# Patient Record
Sex: FEMALE | Race: WHITE | NOT HISPANIC OR LATINO | Employment: OTHER | URBAN - METROPOLITAN AREA
[De-identification: names, ages, dates, MRNs, and addresses within clinical notes are randomized per-mention and may not be internally consistent; named-entity substitution may affect disease eponyms.]

---

## 2018-01-01 ENCOUNTER — HOSPITAL ENCOUNTER (INPATIENT)
Facility: HOSPITAL | Age: 83
LOS: 4 days | Discharge: NURSING, REHAB OR LONG-TERM CARE | End: 2018-01-24
Attending: EMERGENCY MEDICINE | Admitting: HOSPITALIST

## 2018-01-01 ENCOUNTER — OUTSIDE FACILITY SERVICE (OUTPATIENT)
Dept: HOSPITALIST | Facility: HOSPITAL | Age: 83
End: 2018-01-01

## 2018-01-01 ENCOUNTER — APPOINTMENT (OUTPATIENT)
Dept: GENERAL RADIOLOGY | Facility: HOSPITAL | Age: 83
End: 2018-01-01

## 2018-01-01 ENCOUNTER — APPOINTMENT (OUTPATIENT)
Dept: CARDIOLOGY | Facility: HOSPITAL | Age: 83
End: 2018-01-01
Attending: HOSPITALIST

## 2018-01-01 ENCOUNTER — APPOINTMENT (OUTPATIENT)
Dept: CT IMAGING | Facility: HOSPITAL | Age: 83
End: 2018-01-01

## 2018-01-01 ENCOUNTER — HOSPITAL ENCOUNTER (INPATIENT)
Facility: HOSPITAL | Age: 83
LOS: 5 days | End: 2018-02-10
Attending: HOSPITALIST | Admitting: HOSPITALIST

## 2018-01-01 ENCOUNTER — HOSPITAL ENCOUNTER (INPATIENT)
Facility: HOSPITAL | Age: 83
LOS: 9 days | End: 2018-02-05
Attending: EMERGENCY MEDICINE | Admitting: HOSPITALIST

## 2018-01-01 VITALS
SYSTOLIC BLOOD PRESSURE: 102 MMHG | RESPIRATION RATE: 32 BRPM | HEART RATE: 116 BPM | TEMPERATURE: 101.8 F | OXYGEN SATURATION: 79 % | DIASTOLIC BLOOD PRESSURE: 61 MMHG

## 2018-01-01 VITALS
HEIGHT: 62 IN | HEART RATE: 65 BPM | DIASTOLIC BLOOD PRESSURE: 71 MMHG | BODY MASS INDEX: 30.69 KG/M2 | SYSTOLIC BLOOD PRESSURE: 106 MMHG | RESPIRATION RATE: 16 BRPM | OXYGEN SATURATION: 91 % | TEMPERATURE: 97.9 F | WEIGHT: 166.8 LBS

## 2018-01-01 VITALS
HEIGHT: 65 IN | WEIGHT: 155.2 LBS | DIASTOLIC BLOOD PRESSURE: 62 MMHG | OXYGEN SATURATION: 93 % | RESPIRATION RATE: 18 BRPM | BODY MASS INDEX: 25.86 KG/M2 | HEART RATE: 52 BPM | SYSTOLIC BLOOD PRESSURE: 96 MMHG | TEMPERATURE: 96.9 F

## 2018-01-01 DIAGNOSIS — R06.03 RESPIRATORY DISTRESS, ACUTE: Primary | ICD-10-CM

## 2018-01-01 DIAGNOSIS — I50.22 CHRONIC SYSTOLIC CONGESTIVE HEART FAILURE (HCC): ICD-10-CM

## 2018-01-01 DIAGNOSIS — I21.4 NON-STEMI (NON-ST ELEVATED MYOCARDIAL INFARCTION) (HCC): Primary | ICD-10-CM

## 2018-01-01 DIAGNOSIS — M62.81 MUSCLE WEAKNESS (GENERALIZED): ICD-10-CM

## 2018-01-01 DIAGNOSIS — I48.20 CHRONIC A-FIB (HCC): ICD-10-CM

## 2018-01-01 DIAGNOSIS — R09.02 HYPOXIA: ICD-10-CM

## 2018-01-01 LAB
ALBUMIN SERPL-MCNC: 3.4 G/DL (ref 3.5–5.2)
ALBUMIN SERPL-MCNC: 3.9 G/DL (ref 3.5–5.2)
ALBUMIN SERPL-MCNC: 3.9 G/DL (ref 3.5–5.2)
ALBUMIN/GLOB SERPL: 1.1 G/DL
ALBUMIN/GLOB SERPL: 1.2 G/DL
ALBUMIN/GLOB SERPL: 1.3 G/DL
ALP SERPL-CCNC: 50 U/L (ref 39–117)
ALP SERPL-CCNC: 62 U/L (ref 39–117)
ALP SERPL-CCNC: 62 U/L (ref 39–117)
ALT SERPL W P-5'-P-CCNC: 10 U/L (ref 1–33)
ALT SERPL W P-5'-P-CCNC: 9 U/L (ref 1–33)
ALT SERPL W P-5'-P-CCNC: 9 U/L (ref 1–33)
AMORPH URATE CRY URNS QL MICRO: ABNORMAL /HPF
ANION GAP SERPL CALCULATED.3IONS-SCNC: 11.7 MMOL/L
ANION GAP SERPL CALCULATED.3IONS-SCNC: 11.8 MMOL/L
ANION GAP SERPL CALCULATED.3IONS-SCNC: 11.8 MMOL/L
ANION GAP SERPL CALCULATED.3IONS-SCNC: 12.7 MMOL/L
ANION GAP SERPL CALCULATED.3IONS-SCNC: 12.9 MMOL/L
ANION GAP SERPL CALCULATED.3IONS-SCNC: 13.1 MMOL/L
ANION GAP SERPL CALCULATED.3IONS-SCNC: 13.3 MMOL/L
ANION GAP SERPL CALCULATED.3IONS-SCNC: 14.1 MMOL/L
ANION GAP SERPL CALCULATED.3IONS-SCNC: 15.2 MMOL/L
ANION GAP SERPL CALCULATED.3IONS-SCNC: 15.2 MMOL/L
ANION GAP SERPL CALCULATED.3IONS-SCNC: 15.5 MMOL/L
ARTERIAL PATENCY WRIST A: POSITIVE
AST SERPL-CCNC: 20 U/L (ref 1–32)
AST SERPL-CCNC: 20 U/L (ref 1–32)
AST SERPL-CCNC: 25 U/L (ref 1–32)
ATMOSPHERIC PRESS: 755 MMHG
BACTERIA SPEC AEROBE CULT: NORMAL
BACTERIA UR QL AUTO: ABNORMAL /HPF
BASE EXCESS BLDA CALC-SCNC: 1.7 MMOL/L (ref 0–2)
BASOPHILS # BLD AUTO: 0.01 10*3/MM3 (ref 0–0.2)
BASOPHILS NFR BLD AUTO: 0.1 % (ref 0–1.5)
BASOPHILS NFR BLD AUTO: 0.2 % (ref 0–1.5)
BASOPHILS NFR BLD AUTO: 0.3 % (ref 0–1.5)
BDY SITE: ABNORMAL
BH CV ECHO MEAS - AO MAX PG (FULL): 39.2 MMHG
BH CV ECHO MEAS - AO MAX PG: 42.8 MMHG
BH CV ECHO MEAS - AO MEAN PG (FULL): 20 MMHG
BH CV ECHO MEAS - AO MEAN PG: 22 MMHG
BH CV ECHO MEAS - AO ROOT AREA (BSA CORRECTED): 1.9
BH CV ECHO MEAS - AO ROOT AREA: 9.1 CM^2
BH CV ECHO MEAS - AO ROOT DIAM: 3.4 CM
BH CV ECHO MEAS - AO V2 MAX: 327 CM/SEC
BH CV ECHO MEAS - AO V2 MEAN: 220 CM/SEC
BH CV ECHO MEAS - AO V2 VTI: 74.2 CM
BH CV ECHO MEAS - AVA(I,A): 1 CM^2
BH CV ECHO MEAS - AVA(I,D): 1 CM^2
BH CV ECHO MEAS - AVA(V,A): 1 CM^2
BH CV ECHO MEAS - AVA(V,D): 1 CM^2
BH CV ECHO MEAS - BSA(HAYCOCK): 1.9 M^2
BH CV ECHO MEAS - BSA: 1.8 M^2
BH CV ECHO MEAS - BZI_BMI: 32 KILOGRAMS/M^2
BH CV ECHO MEAS - BZI_METRIC_HEIGHT: 157.5 CM
BH CV ECHO MEAS - BZI_METRIC_WEIGHT: 79.4 KG
BH CV ECHO MEAS - CONTRAST EF (2CH): 62.5 ML/M^2
BH CV ECHO MEAS - CONTRAST EF 4CH: 62.5 ML/M^2
BH CV ECHO MEAS - EDV(CUBED): 42.9 ML
BH CV ECHO MEAS - EDV(MOD-SP2): 72 ML
BH CV ECHO MEAS - EDV(MOD-SP4): 80 ML
BH CV ECHO MEAS - EDV(TEICH): 50.9 ML
BH CV ECHO MEAS - EF(CUBED): 71.6 %
BH CV ECHO MEAS - EF(MOD-SP2): 62.5 %
BH CV ECHO MEAS - EF(MOD-SP4): 62.5 %
BH CV ECHO MEAS - EF(TEICH): 64.4 %
BH CV ECHO MEAS - ESV(CUBED): 12.2 ML
BH CV ECHO MEAS - ESV(MOD-SP2): 27 ML
BH CV ECHO MEAS - ESV(MOD-SP4): 30 ML
BH CV ECHO MEAS - ESV(TEICH): 18.1 ML
BH CV ECHO MEAS - FS: 34.3 %
BH CV ECHO MEAS - IVS/LVPW: 1
BH CV ECHO MEAS - IVSD: 0.8 CM
BH CV ECHO MEAS - LAT PEAK E' VEL: 7 CM/SEC
BH CV ECHO MEAS - LV DIASTOLIC VOL/BSA (35-75): 44.3 ML/M^2
BH CV ECHO MEAS - LV MASS(C)D: 75.3 GRAMS
BH CV ECHO MEAS - LV MASS(C)DI: 41.7 GRAMS/M^2
BH CV ECHO MEAS - LV MAX PG: 3.5 MMHG
BH CV ECHO MEAS - LV MEAN PG: 2 MMHG
BH CV ECHO MEAS - LV SYSTOLIC VOL/BSA (12-30): 16.6 ML/M^2
BH CV ECHO MEAS - LV V1 MAX: 94.2 CM/SEC
BH CV ECHO MEAS - LV V1 MEAN: 59 CM/SEC
BH CV ECHO MEAS - LV V1 VTI: 21.6 CM
BH CV ECHO MEAS - LVIDD: 3.5 CM
BH CV ECHO MEAS - LVIDS: 2.3 CM
BH CV ECHO MEAS - LVLD AP2: 6.7 CM
BH CV ECHO MEAS - LVLD AP4: 6.9 CM
BH CV ECHO MEAS - LVLS AP2: 5.9 CM
BH CV ECHO MEAS - LVLS AP4: 6.3 CM
BH CV ECHO MEAS - LVOT AREA (M): 3.5 CM^2
BH CV ECHO MEAS - LVOT AREA: 3.5 CM^2
BH CV ECHO MEAS - LVOT DIAM: 2.1 CM
BH CV ECHO MEAS - LVPWD: 0.8 CM
BH CV ECHO MEAS - MED PEAK E' VEL: 6 CM/SEC
BH CV ECHO MEAS - MV DEC SLOPE: 523.5 CM/SEC^2
BH CV ECHO MEAS - MV DEC TIME: 0.37 SEC
BH CV ECHO MEAS - MV E MAX VEL: 138 CM/SEC
BH CV ECHO MEAS - MV MAX PG: 9.3 MMHG
BH CV ECHO MEAS - MV MEAN PG: 3 MMHG
BH CV ECHO MEAS - MV P1/2T MAX VEL: 153 CM/SEC
BH CV ECHO MEAS - MV P1/2T: 85.6 MSEC
BH CV ECHO MEAS - MV V2 MAX: 152.5 CM/SEC
BH CV ECHO MEAS - MV V2 MEAN: 69 CM/SEC
BH CV ECHO MEAS - MV V2 VTI: 45.9 CM
BH CV ECHO MEAS - MVA P1/2T LCG: 1.4 CM^2
BH CV ECHO MEAS - MVA(P1/2T): 2.6 CM^2
BH CV ECHO MEAS - MVA(VTI): 1.6 CM^2
BH CV ECHO MEAS - PA ACC TIME: 0.15 SEC
BH CV ECHO MEAS - PA MAX PG (FULL): 6.3 MMHG
BH CV ECHO MEAS - PA MAX PG: 7.4 MMHG
BH CV ECHO MEAS - PA PR(ACCEL): 10.2 MMHG
BH CV ECHO MEAS - PA V2 MAX: 136 CM/SEC
BH CV ECHO MEAS - PVA(V,A): 1.4 CM^2
BH CV ECHO MEAS - PVA(V,D): 1.4 CM^2
BH CV ECHO MEAS - QP/QS: 0.55
BH CV ECHO MEAS - RAP SYSTOLE: 8 MMHG
BH CV ECHO MEAS - RV MAX PG: 1.1 MMHG
BH CV ECHO MEAS - RV MEAN PG: 0 MMHG
BH CV ECHO MEAS - RV V1 MAX: 51.7 CM/SEC
BH CV ECHO MEAS - RV V1 MEAN: 27.9 CM/SEC
BH CV ECHO MEAS - RV V1 VTI: 10.8 CM
BH CV ECHO MEAS - RVOT AREA: 3.8 CM^2
BH CV ECHO MEAS - RVOT DIAM: 2.2 CM
BH CV ECHO MEAS - RVSP: 38.5 MMHG
BH CV ECHO MEAS - SI(AO): 373 ML/M^2
BH CV ECHO MEAS - SI(CUBED): 17 ML/M^2
BH CV ECHO MEAS - SI(LVOT): 41.4 ML/M^2
BH CV ECHO MEAS - SI(MOD-SP2): 24.9 ML/M^2
BH CV ECHO MEAS - SI(MOD-SP4): 27.7 ML/M^2
BH CV ECHO MEAS - SI(TEICH): 18.1 ML/M^2
BH CV ECHO MEAS - SV(AO): 673.7 ML
BH CV ECHO MEAS - SV(CUBED): 30.7 ML
BH CV ECHO MEAS - SV(LVOT): 74.8 ML
BH CV ECHO MEAS - SV(MOD-SP2): 45 ML
BH CV ECHO MEAS - SV(MOD-SP4): 50 ML
BH CV ECHO MEAS - SV(RVOT): 41.1 ML
BH CV ECHO MEAS - SV(TEICH): 32.7 ML
BH CV ECHO MEAS - TAPSE (>1.6): 1.6 CM2
BH CV ECHO MEAS - TR MAX VEL: 276 CM/SEC
BH CV VAS BP LEFT ARM: NORMAL MMHG
BH CV XLRA - RV BASE: 4.5 CM
BH CV XLRA - TDI S': 10 CM/SEC
BILIRUB SERPL-MCNC: 0.7 MG/DL (ref 0.1–1.2)
BILIRUB SERPL-MCNC: 0.8 MG/DL (ref 0.1–1.2)
BILIRUB SERPL-MCNC: 1 MG/DL (ref 0.1–1.2)
BILIRUB UR QL STRIP: NEGATIVE
BUN BLD-MCNC: 10 MG/DL (ref 8–23)
BUN BLD-MCNC: 14 MG/DL (ref 8–23)
BUN BLD-MCNC: 17 MG/DL (ref 8–23)
BUN BLD-MCNC: 19 MG/DL (ref 8–23)
BUN BLD-MCNC: 20 MG/DL (ref 8–23)
BUN BLD-MCNC: 21 MG/DL (ref 8–23)
BUN BLD-MCNC: 23 MG/DL (ref 8–23)
BUN BLD-MCNC: 24 MG/DL (ref 8–23)
BUN BLD-MCNC: 30 MG/DL (ref 8–23)
BUN BLD-MCNC: 33 MG/DL (ref 8–23)
BUN BLD-MCNC: 9 MG/DL (ref 8–23)
BUN/CREAT SERPL: 11.1 (ref 7–25)
BUN/CREAT SERPL: 11.5 (ref 7–25)
BUN/CREAT SERPL: 18.7 (ref 7–25)
BUN/CREAT SERPL: 26 (ref 7–25)
BUN/CREAT SERPL: 26.2 (ref 7–25)
BUN/CREAT SERPL: 31.1 (ref 7–25)
BUN/CREAT SERPL: 32.8 (ref 7–25)
BUN/CREAT SERPL: 33.3 (ref 7–25)
BUN/CREAT SERPL: 37.7 (ref 7–25)
BUN/CREAT SERPL: 46.5 (ref 7–25)
BUN/CREAT SERPL: 51.7 (ref 7–25)
CALCIUM SPEC-SCNC: 9 MG/DL (ref 8.6–10.5)
CALCIUM SPEC-SCNC: 9 MG/DL (ref 8.6–10.5)
CALCIUM SPEC-SCNC: 9.1 MG/DL (ref 8.6–10.5)
CALCIUM SPEC-SCNC: 9.2 MG/DL (ref 8.6–10.5)
CALCIUM SPEC-SCNC: 9.4 MG/DL (ref 8.6–10.5)
CALCIUM SPEC-SCNC: 9.6 MG/DL (ref 8.6–10.5)
CALCIUM SPEC-SCNC: 9.6 MG/DL (ref 8.6–10.5)
CALCIUM SPEC-SCNC: 9.8 MG/DL (ref 8.6–10.5)
CALCIUM SPEC-SCNC: 9.9 MG/DL (ref 8.6–10.5)
CHLORIDE SERPL-SCNC: 101 MMOL/L (ref 98–107)
CHLORIDE SERPL-SCNC: 103 MMOL/L (ref 98–107)
CHLORIDE SERPL-SCNC: 103 MMOL/L (ref 98–107)
CHLORIDE SERPL-SCNC: 104 MMOL/L (ref 98–107)
CHLORIDE SERPL-SCNC: 104 MMOL/L (ref 98–107)
CHLORIDE SERPL-SCNC: 105 MMOL/L (ref 98–107)
CHLORIDE SERPL-SCNC: 105 MMOL/L (ref 98–107)
CHLORIDE SERPL-SCNC: 107 MMOL/L (ref 98–107)
CHLORIDE SERPL-SCNC: 111 MMOL/L (ref 98–107)
CHLORIDE SERPL-SCNC: 98 MMOL/L (ref 98–107)
CHLORIDE SERPL-SCNC: 99 MMOL/L (ref 98–107)
CHOLEST SERPL-MCNC: 135 MG/DL (ref 0–200)
CHOLEST SERPL-MCNC: 184 MG/DL (ref 0–200)
CK MB SERPL-CCNC: 6.17 NG/ML
CK SERPL-CCNC: 91 U/L (ref 20–180)
CLARITY UR: ABNORMAL
CO2 SERPL-SCNC: 24.8 MMOL/L (ref 22–29)
CO2 SERPL-SCNC: 25.2 MMOL/L (ref 22–29)
CO2 SERPL-SCNC: 25.9 MMOL/L (ref 22–29)
CO2 SERPL-SCNC: 26.1 MMOL/L (ref 22–29)
CO2 SERPL-SCNC: 26.3 MMOL/L (ref 22–29)
CO2 SERPL-SCNC: 26.5 MMOL/L (ref 22–29)
CO2 SERPL-SCNC: 26.7 MMOL/L (ref 22–29)
CO2 SERPL-SCNC: 26.8 MMOL/L (ref 22–29)
CO2 SERPL-SCNC: 26.9 MMOL/L (ref 22–29)
CO2 SERPL-SCNC: 27.2 MMOL/L (ref 22–29)
CO2 SERPL-SCNC: 27.3 MMOL/L (ref 22–29)
COLOR UR: YELLOW
CREAT BLD-MCNC: 0.58 MG/DL (ref 0.57–1)
CREAT BLD-MCNC: 0.61 MG/DL (ref 0.57–1)
CREAT BLD-MCNC: 0.61 MG/DL (ref 0.57–1)
CREAT BLD-MCNC: 0.64 MG/DL (ref 0.57–1)
CREAT BLD-MCNC: 0.65 MG/DL (ref 0.57–1)
CREAT BLD-MCNC: 0.71 MG/DL (ref 0.57–1)
CREAT BLD-MCNC: 0.72 MG/DL (ref 0.57–1)
CREAT BLD-MCNC: 0.75 MG/DL (ref 0.57–1)
CREAT BLD-MCNC: 0.77 MG/DL (ref 0.57–1)
CREAT BLD-MCNC: 0.78 MG/DL (ref 0.57–1)
CREAT BLD-MCNC: 0.9 MG/DL (ref 0.57–1)
D-LACTATE SERPL-SCNC: 0.9 MMOL/L (ref 0.5–2)
D-LACTATE SERPL-SCNC: 1.3 MMOL/L (ref 0.5–2)
DEPRECATED RDW RBC AUTO: 43.1 FL (ref 37–54)
DEPRECATED RDW RBC AUTO: 43.7 FL (ref 37–54)
DEPRECATED RDW RBC AUTO: 43.9 FL (ref 37–54)
DEPRECATED RDW RBC AUTO: 44.2 FL (ref 37–54)
DEPRECATED RDW RBC AUTO: 44.5 FL (ref 37–54)
DEPRECATED RDW RBC AUTO: 45.4 FL (ref 37–54)
E/E' RATIO: 22
EOSINOPHIL # BLD AUTO: 0.04 10*3/MM3 (ref 0–0.7)
EOSINOPHIL # BLD AUTO: 0.04 10*3/MM3 (ref 0–0.7)
EOSINOPHIL # BLD AUTO: 0.06 10*3/MM3 (ref 0–0.7)
EOSINOPHIL # BLD AUTO: 0.08 10*3/MM3 (ref 0–0.7)
EOSINOPHIL # BLD AUTO: 0.09 10*3/MM3 (ref 0–0.7)
EOSINOPHIL # BLD AUTO: 0.11 10*3/MM3 (ref 0–0.7)
EOSINOPHIL NFR BLD AUTO: 0.9 % (ref 0.3–6.2)
EOSINOPHIL NFR BLD AUTO: 1 % (ref 0.3–6.2)
EOSINOPHIL NFR BLD AUTO: 1.3 % (ref 0.3–6.2)
EOSINOPHIL NFR BLD AUTO: 1.4 % (ref 0.3–6.2)
EOSINOPHIL NFR BLD AUTO: 1.4 % (ref 0.3–6.2)
EOSINOPHIL NFR BLD AUTO: 2.2 % (ref 0.3–6.2)
ERYTHROCYTE [DISTWIDTH] IN BLOOD BY AUTOMATED COUNT: 12.7 % (ref 11.7–13)
ERYTHROCYTE [DISTWIDTH] IN BLOOD BY AUTOMATED COUNT: 12.8 % (ref 11.7–13)
ERYTHROCYTE [DISTWIDTH] IN BLOOD BY AUTOMATED COUNT: 12.9 % (ref 11.7–13)
ERYTHROCYTE [DISTWIDTH] IN BLOOD BY AUTOMATED COUNT: 13 % (ref 11.7–13)
ERYTHROCYTE [DISTWIDTH] IN BLOOD BY AUTOMATED COUNT: 13 % (ref 11.7–13)
ERYTHROCYTE [DISTWIDTH] IN BLOOD BY AUTOMATED COUNT: 13.1 % (ref 11.7–13)
FLUAV AG NPH QL: NEGATIVE
FLUBV AG NPH QL IA: NEGATIVE
GAS FLOW AIRWAY: 6 LPM
GFR SERPL CREATININE-BSD FRML MDRD: 59 ML/MIN/1.73
GFR SERPL CREATININE-BSD FRML MDRD: 70 ML/MIN/1.73
GFR SERPL CREATININE-BSD FRML MDRD: 71 ML/MIN/1.73
GFR SERPL CREATININE-BSD FRML MDRD: 73 ML/MIN/1.73
GFR SERPL CREATININE-BSD FRML MDRD: 76 ML/MIN/1.73
GFR SERPL CREATININE-BSD FRML MDRD: 78 ML/MIN/1.73
GFR SERPL CREATININE-BSD FRML MDRD: 86 ML/MIN/1.73
GFR SERPL CREATININE-BSD FRML MDRD: 88 ML/MIN/1.73
GFR SERPL CREATININE-BSD FRML MDRD: 93 ML/MIN/1.73
GFR SERPL CREATININE-BSD FRML MDRD: 93 ML/MIN/1.73
GFR SERPL CREATININE-BSD FRML MDRD: 98 ML/MIN/1.73
GLOBULIN UR ELPH-MCNC: 2.7 GM/DL
GLOBULIN UR ELPH-MCNC: 3.3 GM/DL
GLOBULIN UR ELPH-MCNC: 3.6 GM/DL
GLUCOSE BLD-MCNC: 100 MG/DL (ref 65–99)
GLUCOSE BLD-MCNC: 101 MG/DL (ref 65–99)
GLUCOSE BLD-MCNC: 103 MG/DL (ref 65–99)
GLUCOSE BLD-MCNC: 107 MG/DL (ref 65–99)
GLUCOSE BLD-MCNC: 108 MG/DL (ref 65–99)
GLUCOSE BLD-MCNC: 108 MG/DL (ref 65–99)
GLUCOSE BLD-MCNC: 141 MG/DL (ref 65–99)
GLUCOSE BLD-MCNC: 90 MG/DL (ref 65–99)
GLUCOSE BLD-MCNC: 94 MG/DL (ref 65–99)
GLUCOSE BLD-MCNC: 96 MG/DL (ref 65–99)
GLUCOSE BLD-MCNC: 97 MG/DL (ref 65–99)
GLUCOSE BLDC GLUCOMTR-MCNC: 117 MG/DL (ref 70–130)
GLUCOSE UR STRIP-MCNC: NEGATIVE MG/DL
HBA1C MFR BLD: 4.6 % (ref 4.8–5.6)
HBA1C MFR BLD: 4.78 % (ref 4.8–5.6)
HCO3 BLDA-SCNC: 26 MMOL/L (ref 22–28)
HCT VFR BLD AUTO: 37.9 % (ref 35.6–45.5)
HCT VFR BLD AUTO: 38 % (ref 35.6–45.5)
HCT VFR BLD AUTO: 38 % (ref 35.6–45.5)
HCT VFR BLD AUTO: 38.7 % (ref 35.6–45.5)
HCT VFR BLD AUTO: 38.8 % (ref 35.6–45.5)
HCT VFR BLD AUTO: 42.3 % (ref 35.6–45.5)
HDLC SERPL-MCNC: 41 MG/DL (ref 40–60)
HDLC SERPL-MCNC: 44 MG/DL (ref 40–60)
HGB BLD-MCNC: 12.5 G/DL (ref 11.9–15.5)
HGB BLD-MCNC: 12.5 G/DL (ref 11.9–15.5)
HGB BLD-MCNC: 12.8 G/DL (ref 11.9–15.5)
HGB BLD-MCNC: 12.9 G/DL (ref 11.9–15.5)
HGB BLD-MCNC: 13.2 G/DL (ref 11.9–15.5)
HGB BLD-MCNC: 14.4 G/DL (ref 11.9–15.5)
HGB UR QL STRIP.AUTO: ABNORMAL
HYALINE CASTS UR QL AUTO: ABNORMAL /LPF
IMM GRANULOCYTES # BLD: 0 10*3/MM3 (ref 0–0.03)
IMM GRANULOCYTES # BLD: 0.02 10*3/MM3 (ref 0–0.03)
IMM GRANULOCYTES # BLD: 0.03 10*3/MM3 (ref 0–0.03)
IMM GRANULOCYTES # BLD: 0.05 10*3/MM3 (ref 0–0.03)
IMM GRANULOCYTES NFR BLD: 0 % (ref 0–0.5)
IMM GRANULOCYTES NFR BLD: 0.5 % (ref 0–0.5)
IMM GRANULOCYTES NFR BLD: 0.5 % (ref 0–0.5)
IMM GRANULOCYTES NFR BLD: 0.6 % (ref 0–0.5)
INR PPP: 1.39 (ref 0.9–1.1)
INR PPP: 1.55 (ref 0.9–1.1)
INR PPP: 1.58 (ref 0.9–1.1)
INR PPP: 1.59 (ref 0.9–1.1)
INR PPP: 1.65 (ref 0.9–1.1)
INR PPP: 1.83 (ref 0.9–1.1)
INR PPP: 2.17 (ref 0.9–1.1)
INR PPP: 2.54 (ref 0.9–1.1)
INR PPP: 2.55 (ref 0.9–1.1)
INR PPP: 2.58 (ref 0.9–1.1)
INR PPP: 2.6 (ref 0.9–1.1)
INR PPP: 2.66 (ref 0.9–1.1)
INR PPP: 3.03 (ref 0.9–1.1)
KETONES UR QL STRIP: NEGATIVE
LDLC SERPL CALC-MCNC: 120 MG/DL (ref 0–100)
LDLC SERPL CALC-MCNC: 72 MG/DL (ref 0–100)
LDLC/HDLC SERPL: 1.77 {RATIO}
LDLC/HDLC SERPL: 2.74 {RATIO}
LEFT ATRIUM VOLUME INDEX: 87 ML/M2
LEFT ATRIUM VOLUME: 147 CM3
LEUKOCYTE ESTERASE UR QL STRIP.AUTO: ABNORMAL
LYMPHOCYTES # BLD AUTO: 0.54 10*3/MM3 (ref 0.9–4.8)
LYMPHOCYTES # BLD AUTO: 0.7 10*3/MM3 (ref 0.9–4.8)
LYMPHOCYTES # BLD AUTO: 0.76 10*3/MM3 (ref 0.9–4.8)
LYMPHOCYTES # BLD AUTO: 0.83 10*3/MM3 (ref 0.9–4.8)
LYMPHOCYTES # BLD AUTO: 0.88 10*3/MM3 (ref 0.9–4.8)
LYMPHOCYTES # BLD AUTO: 0.9 10*3/MM3 (ref 0.9–4.8)
LYMPHOCYTES NFR BLD AUTO: 11.1 % (ref 19.6–45.3)
LYMPHOCYTES NFR BLD AUTO: 11.3 % (ref 19.6–45.3)
LYMPHOCYTES NFR BLD AUTO: 15.8 % (ref 19.6–45.3)
LYMPHOCYTES NFR BLD AUTO: 17.4 % (ref 19.6–45.3)
LYMPHOCYTES NFR BLD AUTO: 17.8 % (ref 19.6–45.3)
LYMPHOCYTES NFR BLD AUTO: 20.2 % (ref 19.6–45.3)
MAXIMAL PREDICTED HEART RATE: 132 BPM
MCH RBC QN AUTO: 30.9 PG (ref 26.9–32)
MCH RBC QN AUTO: 30.9 PG (ref 26.9–32)
MCH RBC QN AUTO: 31.4 PG (ref 26.9–32)
MCH RBC QN AUTO: 31.4 PG (ref 26.9–32)
MCH RBC QN AUTO: 31.9 PG (ref 26.9–32)
MCH RBC QN AUTO: 32 PG (ref 26.9–32)
MCHC RBC AUTO-ENTMCNC: 32.2 G/DL (ref 32.4–36.3)
MCHC RBC AUTO-ENTMCNC: 33 G/DL (ref 32.4–36.3)
MCHC RBC AUTO-ENTMCNC: 33.3 G/DL (ref 32.4–36.3)
MCHC RBC AUTO-ENTMCNC: 33.7 G/DL (ref 32.4–36.3)
MCHC RBC AUTO-ENTMCNC: 34 G/DL (ref 32.4–36.3)
MCHC RBC AUTO-ENTMCNC: 34.7 G/DL (ref 32.4–36.3)
MCV RBC AUTO: 92.2 FL (ref 80.5–98.2)
MCV RBC AUTO: 92.8 FL (ref 80.5–98.2)
MCV RBC AUTO: 93.1 FL (ref 80.5–98.2)
MCV RBC AUTO: 93.6 FL (ref 80.5–98.2)
MCV RBC AUTO: 95.2 FL (ref 80.5–98.2)
MCV RBC AUTO: 95.8 FL (ref 80.5–98.2)
MODALITY: ABNORMAL
MONOCYTES # BLD AUTO: 0.27 10*3/MM3 (ref 0.2–1.2)
MONOCYTES # BLD AUTO: 0.36 10*3/MM3 (ref 0.2–1.2)
MONOCYTES # BLD AUTO: 0.4 10*3/MM3 (ref 0.2–1.2)
MONOCYTES # BLD AUTO: 0.43 10*3/MM3 (ref 0.2–1.2)
MONOCYTES # BLD AUTO: 0.47 10*3/MM3 (ref 0.2–1.2)
MONOCYTES # BLD AUTO: 0.72 10*3/MM3 (ref 0.2–1.2)
MONOCYTES NFR BLD AUTO: 10.2 % (ref 5–12)
MONOCYTES NFR BLD AUTO: 6.6 % (ref 5–12)
MONOCYTES NFR BLD AUTO: 8.3 % (ref 5–12)
MONOCYTES NFR BLD AUTO: 8.4 % (ref 5–12)
MONOCYTES NFR BLD AUTO: 8.8 % (ref 5–12)
MONOCYTES NFR BLD AUTO: 9 % (ref 5–12)
NEUTROPHILS # BLD AUTO: 2.78 10*3/MM3 (ref 1.9–8.1)
NEUTROPHILS # BLD AUTO: 2.9 10*3/MM3 (ref 1.9–8.1)
NEUTROPHILS # BLD AUTO: 3.17 10*3/MM3 (ref 1.9–8.1)
NEUTROPHILS # BLD AUTO: 3.72 10*3/MM3 (ref 1.9–8.1)
NEUTROPHILS # BLD AUTO: 4.11 10*3/MM3 (ref 1.9–8.1)
NEUTROPHILS # BLD AUTO: 6.35 10*3/MM3 (ref 1.9–8.1)
NEUTROPHILS NFR BLD AUTO: 70.7 % (ref 42.7–76)
NEUTROPHILS NFR BLD AUTO: 70.8 % (ref 42.7–76)
NEUTROPHILS NFR BLD AUTO: 72.7 % (ref 42.7–76)
NEUTROPHILS NFR BLD AUTO: 73.7 % (ref 42.7–76)
NEUTROPHILS NFR BLD AUTO: 78 % (ref 42.7–76)
NEUTROPHILS NFR BLD AUTO: 78.2 % (ref 42.7–76)
NITRITE UR QL STRIP: NEGATIVE
NRBC BLD MANUAL-RTO: 0 /100 WBC (ref 0–0)
NT-PROBNP SERPL-MCNC: 1253 PG/ML (ref 0–1800)
NT-PROBNP SERPL-MCNC: 1392 PG/ML (ref 0–1800)
NT-PROBNP SERPL-MCNC: 1613 PG/ML (ref 0–1800)
NT-PROBNP SERPL-MCNC: 1919 PG/ML (ref 0–1800)
NT-PROBNP SERPL-MCNC: 1922 PG/ML (ref 0–1800)
NT-PROBNP SERPL-MCNC: 1987 PG/ML (ref 0–1800)
NT-PROBNP SERPL-MCNC: 2173 PG/ML (ref 0–1800)
NT-PROBNP SERPL-MCNC: 2557 PG/ML (ref 0–1800)
PCO2 BLDA: 38.9 MM HG (ref 35–45)
PH BLDA: 7.43 PH UNITS (ref 7.35–7.45)
PH UR STRIP.AUTO: 7.5 [PH] (ref 5–8)
PLATELET # BLD AUTO: 109 10*3/MM3 (ref 140–500)
PLATELET # BLD AUTO: 117 10*3/MM3 (ref 140–500)
PLATELET # BLD AUTO: 127 10*3/MM3 (ref 140–500)
PLATELET # BLD AUTO: 129 10*3/MM3 (ref 140–500)
PLATELET # BLD AUTO: 130 10*3/MM3 (ref 140–500)
PLATELET # BLD AUTO: 143 10*3/MM3 (ref 140–500)
PMV BLD AUTO: 10.1 FL (ref 6–12)
PMV BLD AUTO: 10.1 FL (ref 6–12)
PMV BLD AUTO: 10.2 FL (ref 6–12)
PMV BLD AUTO: 10.8 FL (ref 6–12)
PMV BLD AUTO: 11.1 FL (ref 6–12)
PMV BLD AUTO: 9.7 FL (ref 6–12)
PO2 BLDA: 63 MM HG (ref 80–100)
POTASSIUM BLD-SCNC: 3 MMOL/L (ref 3.5–5.2)
POTASSIUM BLD-SCNC: 3 MMOL/L (ref 3.5–5.2)
POTASSIUM BLD-SCNC: 3.5 MMOL/L (ref 3.5–5.2)
POTASSIUM BLD-SCNC: 3.5 MMOL/L (ref 3.5–5.2)
POTASSIUM BLD-SCNC: 3.8 MMOL/L (ref 3.5–5.2)
POTASSIUM BLD-SCNC: 3.9 MMOL/L (ref 3.5–5.2)
POTASSIUM BLD-SCNC: 4.1 MMOL/L (ref 3.5–5.2)
POTASSIUM BLD-SCNC: 4.1 MMOL/L (ref 3.5–5.2)
POTASSIUM BLD-SCNC: 4.2 MMOL/L (ref 3.5–5.2)
POTASSIUM BLD-SCNC: 4.2 MMOL/L (ref 3.5–5.2)
POTASSIUM BLD-SCNC: 4.4 MMOL/L (ref 3.5–5.2)
PROCALCITONIN SERPL-MCNC: 0.04 NG/ML (ref 0.1–0.25)
PROT SERPL-MCNC: 6.1 G/DL (ref 6–8.5)
PROT SERPL-MCNC: 7.2 G/DL (ref 6–8.5)
PROT SERPL-MCNC: 7.5 G/DL (ref 6–8.5)
PROT UR QL STRIP: NEGATIVE
PROTHROMBIN TIME: 16.5 SECONDS (ref 11.7–14.2)
PROTHROMBIN TIME: 18.1 SECONDS (ref 11.7–14.2)
PROTHROMBIN TIME: 18.3 SECONDS (ref 11.7–14.2)
PROTHROMBIN TIME: 18.4 SECONDS (ref 11.7–14.2)
PROTHROMBIN TIME: 19 SECONDS (ref 11.7–14.2)
PROTHROMBIN TIME: 20.6 SECONDS (ref 11.7–14.2)
PROTHROMBIN TIME: 23.4 SECONDS (ref 11.7–14.2)
PROTHROMBIN TIME: 26.5 SECONDS (ref 11.7–14.2)
PROTHROMBIN TIME: 26.6 SECONDS (ref 11.7–14.2)
PROTHROMBIN TIME: 26.9 SECONDS (ref 11.7–14.2)
PROTHROMBIN TIME: 27 SECONDS (ref 11.7–14.2)
PROTHROMBIN TIME: 27.5 SECONDS (ref 11.7–14.2)
PROTHROMBIN TIME: 30.5 SECONDS (ref 11.7–14.2)
RBC # BLD AUTO: 3.98 10*6/MM3 (ref 3.9–5.2)
RBC # BLD AUTO: 4.05 10*6/MM3 (ref 3.9–5.2)
RBC # BLD AUTO: 4.08 10*6/MM3 (ref 3.9–5.2)
RBC # BLD AUTO: 4.12 10*6/MM3 (ref 3.9–5.2)
RBC # BLD AUTO: 4.17 10*6/MM3 (ref 3.9–5.2)
RBC # BLD AUTO: 4.52 10*6/MM3 (ref 3.9–5.2)
RBC # UR: ABNORMAL /HPF
REF LAB TEST METHOD: ABNORMAL
SAO2 % BLDCOA: 92.4 % (ref 92–99)
SODIUM BLD-SCNC: 139 MMOL/L (ref 136–145)
SODIUM BLD-SCNC: 140 MMOL/L (ref 136–145)
SODIUM BLD-SCNC: 141 MMOL/L (ref 136–145)
SODIUM BLD-SCNC: 141 MMOL/L (ref 136–145)
SODIUM BLD-SCNC: 143 MMOL/L (ref 136–145)
SODIUM BLD-SCNC: 143 MMOL/L (ref 136–145)
SODIUM BLD-SCNC: 145 MMOL/L (ref 136–145)
SODIUM BLD-SCNC: 145 MMOL/L (ref 136–145)
SODIUM BLD-SCNC: 146 MMOL/L (ref 136–145)
SODIUM BLD-SCNC: 146 MMOL/L (ref 136–145)
SODIUM BLD-SCNC: 148 MMOL/L (ref 136–145)
SP GR UR STRIP: 1.01 (ref 1–1.03)
SQUAMOUS #/AREA URNS HPF: ABNORMAL /HPF
STRESS TARGET HR: 112 BPM
TOTAL RATE: 18 BREATHS/MINUTE
TRANS CELLS #/AREA URNS HPF: ABNORMAL /HPF
TRIGL SERPL-MCNC: 108 MG/DL (ref 0–150)
TRIGL SERPL-MCNC: 98 MG/DL (ref 0–150)
TROPONIN T SERPL-MCNC: 0.02 NG/ML (ref 0–0.03)
TROPONIN T SERPL-MCNC: 0.14 NG/ML (ref 0–0.03)
TROPONIN T SERPL-MCNC: 0.34 NG/ML (ref 0–0.03)
TROPONIN T SERPL-MCNC: <0.01 NG/ML (ref 0–0.03)
TSH SERPL DL<=0.05 MIU/L-ACNC: 1.69 MIU/ML (ref 0.27–4.2)
TSH SERPL DL<=0.05 MIU/L-ACNC: 2.87 MIU/ML (ref 0.27–4.2)
UROBILINOGEN UR QL STRIP: ABNORMAL
VALPROATE FREE SERPL-MCNC: 2.3 UG/ML (ref 6–22)
VALPROATE SERPL-MCNC: 33 MCG/ML (ref 50–125)
VLDLC SERPL-MCNC: 19.6 MG/DL (ref 5–40)
VLDLC SERPL-MCNC: 21.6 MG/DL (ref 5–40)
WBC NRBC COR # BLD: 3.93 10*3/MM3 (ref 4.5–10.7)
WBC NRBC COR # BLD: 4.1 10*3/MM3 (ref 4.5–10.7)
WBC NRBC COR # BLD: 4.36 10*3/MM3 (ref 4.5–10.7)
WBC NRBC COR # BLD: 4.76 10*3/MM3 (ref 4.5–10.7)
WBC NRBC COR # BLD: 5.58 10*3/MM3 (ref 4.5–10.7)
WBC NRBC COR # BLD: 8.14 10*3/MM3 (ref 4.5–10.7)
WBC UR QL AUTO: ABNORMAL /HPF

## 2018-01-01 PROCEDURE — 83605 ASSAY OF LACTIC ACID: CPT | Performed by: EMERGENCY MEDICINE

## 2018-01-01 PROCEDURE — 84484 ASSAY OF TROPONIN QUANT: CPT | Performed by: HOSPITALIST

## 2018-01-01 PROCEDURE — 85025 COMPLETE CBC W/AUTO DIFF WBC: CPT | Performed by: HOSPITALIST

## 2018-01-01 PROCEDURE — 25010000002 MORPHINE PER 10 MG: Performed by: HOSPITALIST

## 2018-01-01 PROCEDURE — 93227 XTRNL ECG REC<48 HR R&I: CPT | Performed by: INTERNAL MEDICINE

## 2018-01-01 PROCEDURE — 85610 PROTHROMBIN TIME: CPT | Performed by: HOSPITALIST

## 2018-01-01 PROCEDURE — 80048 BASIC METABOLIC PNL TOTAL CA: CPT | Performed by: INTERNAL MEDICINE

## 2018-01-01 PROCEDURE — 94799 UNLISTED PULMONARY SVC/PX: CPT

## 2018-01-01 PROCEDURE — 87804 INFLUENZA ASSAY W/OPTIC: CPT | Performed by: EMERGENCY MEDICINE

## 2018-01-01 PROCEDURE — 83880 ASSAY OF NATRIURETIC PEPTIDE: CPT | Performed by: HOSPITALIST

## 2018-01-01 PROCEDURE — 83880 ASSAY OF NATRIURETIC PEPTIDE: CPT | Performed by: EMERGENCY MEDICINE

## 2018-01-01 PROCEDURE — 82553 CREATINE MB FRACTION: CPT | Performed by: INTERNAL MEDICINE

## 2018-01-01 PROCEDURE — G8997 SWALLOW GOAL STATUS: HCPCS

## 2018-01-01 PROCEDURE — 25010000002 FUROSEMIDE PER 20 MG: Performed by: HOSPITALIST

## 2018-01-01 PROCEDURE — 25010000002 LORAZEPAM PER 2 MG: Performed by: HOSPITALIST

## 2018-01-01 PROCEDURE — 36415 COLL VENOUS BLD VENIPUNCTURE: CPT

## 2018-01-01 PROCEDURE — 85610 PROTHROMBIN TIME: CPT | Performed by: INTERNAL MEDICINE

## 2018-01-01 PROCEDURE — 80165 DIPROPYLACETIC ACID FREE: CPT | Performed by: INTERNAL MEDICINE

## 2018-01-01 PROCEDURE — 93005 ELECTROCARDIOGRAM TRACING: CPT | Performed by: EMERGENCY MEDICINE

## 2018-01-01 PROCEDURE — 85610 PROTHROMBIN TIME: CPT | Performed by: EMERGENCY MEDICINE

## 2018-01-01 PROCEDURE — 93010 ELECTROCARDIOGRAM REPORT: CPT | Performed by: INTERNAL MEDICINE

## 2018-01-01 PROCEDURE — 97162 PT EVAL MOD COMPLEX 30 MIN: CPT

## 2018-01-01 PROCEDURE — 82550 ASSAY OF CK (CPK): CPT | Performed by: INTERNAL MEDICINE

## 2018-01-01 PROCEDURE — 84145 PROCALCITONIN (PCT): CPT | Performed by: EMERGENCY MEDICINE

## 2018-01-01 PROCEDURE — 99304 1ST NF CARE SF/LOW MDM 25: CPT | Performed by: INTERNAL MEDICINE

## 2018-01-01 PROCEDURE — 25010000002 CEFTRIAXONE PER 250 MG: Performed by: HOSPITALIST

## 2018-01-01 PROCEDURE — 99222 1ST HOSP IP/OBS MODERATE 55: CPT | Performed by: INTERNAL MEDICINE

## 2018-01-01 PROCEDURE — 71045 X-RAY EXAM CHEST 1 VIEW: CPT

## 2018-01-01 PROCEDURE — 80048 BASIC METABOLIC PNL TOTAL CA: CPT | Performed by: HOSPITALIST

## 2018-01-01 PROCEDURE — 84443 ASSAY THYROID STIM HORMONE: CPT | Performed by: HOSPITALIST

## 2018-01-01 PROCEDURE — 82962 GLUCOSE BLOOD TEST: CPT

## 2018-01-01 PROCEDURE — 99285 EMERGENCY DEPT VISIT HI MDM: CPT

## 2018-01-01 PROCEDURE — 97110 THERAPEUTIC EXERCISES: CPT

## 2018-01-01 PROCEDURE — 25010000002 FUROSEMIDE PER 20 MG: Performed by: EMERGENCY MEDICINE

## 2018-01-01 PROCEDURE — 80061 LIPID PANEL: CPT | Performed by: HOSPITALIST

## 2018-01-01 PROCEDURE — 80164 ASSAY DIPROPYLACETIC ACD TOT: CPT | Performed by: EMERGENCY MEDICINE

## 2018-01-01 PROCEDURE — 84484 ASSAY OF TROPONIN QUANT: CPT | Performed by: EMERGENCY MEDICINE

## 2018-01-01 PROCEDURE — 99232 SBSQ HOSP IP/OBS MODERATE 35: CPT | Performed by: INTERNAL MEDICINE

## 2018-01-01 PROCEDURE — 87040 BLOOD CULTURE FOR BACTERIA: CPT | Performed by: EMERGENCY MEDICINE

## 2018-01-01 PROCEDURE — 83036 HEMOGLOBIN GLYCOSYLATED A1C: CPT | Performed by: HOSPITALIST

## 2018-01-01 PROCEDURE — G8996 SWALLOW CURRENT STATUS: HCPCS

## 2018-01-01 PROCEDURE — 70450 CT HEAD/BRAIN W/O DYE: CPT

## 2018-01-01 PROCEDURE — 92610 EVALUATE SWALLOWING FUNCTION: CPT

## 2018-01-01 PROCEDURE — 94640 AIRWAY INHALATION TREATMENT: CPT

## 2018-01-01 PROCEDURE — 80053 COMPREHEN METABOLIC PANEL: CPT | Performed by: EMERGENCY MEDICINE

## 2018-01-01 PROCEDURE — 93306 TTE W/DOPPLER COMPLETE: CPT | Performed by: INTERNAL MEDICINE

## 2018-01-01 PROCEDURE — 92526 ORAL FUNCTION THERAPY: CPT

## 2018-01-01 PROCEDURE — 93005 ELECTROCARDIOGRAM TRACING: CPT | Performed by: HOSPITALIST

## 2018-01-01 PROCEDURE — 25010000002 METHYLPREDNISOLONE PER 125 MG: Performed by: EMERGENCY MEDICINE

## 2018-01-01 PROCEDURE — 85025 COMPLETE CBC W/AUTO DIFF WBC: CPT | Performed by: EMERGENCY MEDICINE

## 2018-01-01 PROCEDURE — 25010000002 ENOXAPARIN PER 10 MG: Performed by: INTERNAL MEDICINE

## 2018-01-01 PROCEDURE — 80053 COMPREHEN METABOLIC PANEL: CPT | Performed by: HOSPITALIST

## 2018-01-01 PROCEDURE — 93225 XTRNL ECG REC<48 HRS REC: CPT

## 2018-01-01 PROCEDURE — G8998 SWALLOW D/C STATUS: HCPCS

## 2018-01-01 PROCEDURE — 83880 ASSAY OF NATRIURETIC PEPTIDE: CPT | Performed by: INTERNAL MEDICINE

## 2018-01-01 PROCEDURE — 93226 XTRNL ECG REC<48 HR SCAN A/R: CPT

## 2018-01-01 PROCEDURE — 81001 URINALYSIS AUTO W/SCOPE: CPT | Performed by: EMERGENCY MEDICINE

## 2018-01-01 PROCEDURE — 71046 X-RAY EXAM CHEST 2 VIEWS: CPT

## 2018-01-01 PROCEDURE — 93306 TTE W/DOPPLER COMPLETE: CPT

## 2018-01-01 PROCEDURE — 82803 BLOOD GASES ANY COMBINATION: CPT

## 2018-01-01 PROCEDURE — 36600 WITHDRAWAL OF ARTERIAL BLOOD: CPT

## 2018-01-01 PROCEDURE — 84484 ASSAY OF TROPONIN QUANT: CPT | Performed by: INTERNAL MEDICINE

## 2018-01-01 RX ORDER — PANTOPRAZOLE SODIUM 40 MG/1
40 TABLET, DELAYED RELEASE ORAL DAILY
Status: DISCONTINUED | OUTPATIENT
Start: 2018-01-01 | End: 2018-01-01

## 2018-01-01 RX ORDER — POTASSIUM CHLORIDE 750 MG/1
20 CAPSULE, EXTENDED RELEASE ORAL DAILY
Status: DISCONTINUED | OUTPATIENT
Start: 2018-01-01 | End: 2018-01-01

## 2018-01-01 RX ORDER — LORAZEPAM 1 MG/1
1 TABLET ORAL
Status: DISCONTINUED | OUTPATIENT
Start: 2018-01-01 | End: 2018-01-01 | Stop reason: HOSPADM

## 2018-01-01 RX ORDER — FUROSEMIDE 10 MG/ML
40 INJECTION INTRAMUSCULAR; INTRAVENOUS EVERY 12 HOURS
Status: DISCONTINUED | OUTPATIENT
Start: 2018-01-01 | End: 2018-01-01

## 2018-01-01 RX ORDER — DIPHENOXYLATE HYDROCHLORIDE AND ATROPINE SULFATE 2.5; .025 MG/1; MG/1
1 TABLET ORAL
Status: DISCONTINUED | OUTPATIENT
Start: 2018-01-01 | End: 2018-01-01 | Stop reason: HOSPADM

## 2018-01-01 RX ORDER — ACETAMINOPHEN 650 MG/1
650 SUPPOSITORY RECTAL EVERY 4 HOURS PRN
Status: CANCELLED | OUTPATIENT
Start: 2018-01-01

## 2018-01-01 RX ORDER — PROMETHAZINE HYDROCHLORIDE 6.25 MG/5ML
6.25 SYRUP ORAL EVERY 4 HOURS PRN
Status: DISCONTINUED | OUTPATIENT
Start: 2018-01-01 | End: 2018-01-01 | Stop reason: HOSPADM

## 2018-01-01 RX ORDER — IPRATROPIUM BROMIDE AND ALBUTEROL SULFATE 2.5; .5 MG/3ML; MG/3ML
3 SOLUTION RESPIRATORY (INHALATION) EVERY 4 HOURS PRN
Status: DISCONTINUED | OUTPATIENT
Start: 2018-01-01 | End: 2018-01-01

## 2018-01-01 RX ORDER — LORAZEPAM 2 MG/ML
0.5 INJECTION INTRAMUSCULAR
Status: DISCONTINUED | OUTPATIENT
Start: 2018-01-01 | End: 2018-01-01 | Stop reason: HOSPADM

## 2018-01-01 RX ORDER — LORAZEPAM 2 MG/ML
0.5 CONCENTRATE ORAL
Status: DISCONTINUED | OUTPATIENT
Start: 2018-01-01 | End: 2018-01-01 | Stop reason: HOSPADM

## 2018-01-01 RX ORDER — PROMETHAZINE HYDROCHLORIDE 25 MG/1
6.25 TABLET ORAL EVERY 4 HOURS PRN
Status: CANCELLED | OUTPATIENT
Start: 2018-01-01

## 2018-01-01 RX ORDER — LORAZEPAM 2 MG/ML
1 CONCENTRATE ORAL
Status: DISCONTINUED | OUTPATIENT
Start: 2018-01-01 | End: 2018-01-01 | Stop reason: HOSPADM

## 2018-01-01 RX ORDER — ASPIRIN 81 MG/1
81 TABLET, CHEWABLE ORAL DAILY
Status: DISCONTINUED | OUTPATIENT
Start: 2018-01-01 | End: 2018-01-01

## 2018-01-01 RX ORDER — LORAZEPAM 2 MG/ML
0.5 INJECTION INTRAMUSCULAR
Status: CANCELLED | OUTPATIENT
Start: 2018-01-01 | End: 2018-02-11

## 2018-01-01 RX ORDER — ASPIRIN 300 MG/1
300 SUPPOSITORY RECTAL DAILY
Status: DISCONTINUED | OUTPATIENT
Start: 2018-01-01 | End: 2018-01-01

## 2018-01-01 RX ORDER — FUROSEMIDE 10 MG/ML
40 INJECTION INTRAMUSCULAR; INTRAVENOUS ONCE
Status: COMPLETED | OUTPATIENT
Start: 2018-01-01 | End: 2018-01-01

## 2018-01-01 RX ORDER — LORAZEPAM 2 MG/ML
2 INJECTION INTRAMUSCULAR
Status: DISCONTINUED | OUTPATIENT
Start: 2018-01-01 | End: 2018-01-01 | Stop reason: HOSPADM

## 2018-01-01 RX ORDER — ASPIRIN 325 MG
325 TABLET ORAL ONCE
Status: DISCONTINUED | OUTPATIENT
Start: 2018-01-01 | End: 2018-01-01

## 2018-01-01 RX ORDER — LORAZEPAM 2 MG/ML
0.5 CONCENTRATE ORAL
Status: CANCELLED | OUTPATIENT
Start: 2018-01-01 | End: 2018-02-11

## 2018-01-01 RX ORDER — MORPHINE SULFATE 2 MG/ML
2 INJECTION, SOLUTION INTRAMUSCULAR; INTRAVENOUS
Status: DISCONTINUED | OUTPATIENT
Start: 2018-01-01 | End: 2018-01-01 | Stop reason: HOSPADM

## 2018-01-01 RX ORDER — ASPIRIN 300 MG/1
300 SUPPOSITORY RECTAL ONCE
Status: COMPLETED | OUTPATIENT
Start: 2018-01-01 | End: 2018-01-01

## 2018-01-01 RX ORDER — WARFARIN SODIUM 1 MG/1
1 TABLET ORAL
Status: DISCONTINUED | OUTPATIENT
Start: 2018-01-01 | End: 2018-01-01 | Stop reason: HOSPADM

## 2018-01-01 RX ORDER — ISOSORBIDE DINITRATE 5 MG/1
5 TABLET ORAL EVERY 8 HOURS SCHEDULED
Status: DISCONTINUED | OUTPATIENT
Start: 2018-01-01 | End: 2018-01-01

## 2018-01-01 RX ORDER — GLYCOPYRROLATE 0.2 MG/ML
0.2 INJECTION INTRAMUSCULAR; INTRAVENOUS
Status: CANCELLED | OUTPATIENT
Start: 2018-01-01

## 2018-01-01 RX ORDER — FUROSEMIDE 40 MG/1
40 TABLET ORAL
Status: DISCONTINUED | OUTPATIENT
Start: 2018-01-01 | End: 2018-01-01

## 2018-01-01 RX ORDER — DIPHENOXYLATE HYDROCHLORIDE AND ATROPINE SULFATE 2.5; .025 MG/1; MG/1
1 TABLET ORAL
Status: CANCELLED | OUTPATIENT
Start: 2018-01-01

## 2018-01-01 RX ORDER — LORAZEPAM 2 MG/ML
1 CONCENTRATE ORAL
Status: CANCELLED | OUTPATIENT
Start: 2018-01-01 | End: 2018-02-11

## 2018-01-01 RX ORDER — CETIRIZINE HYDROCHLORIDE 10 MG/1
10 TABLET ORAL DAILY
Status: DISCONTINUED | OUTPATIENT
Start: 2018-01-01 | End: 2018-01-01

## 2018-01-01 RX ORDER — WARFARIN SODIUM 1 MG/1
TABLET ORAL
Qty: 30 TABLET | Refills: 0 | Status: SHIPPED | OUTPATIENT
Start: 2018-01-01

## 2018-01-01 RX ORDER — FUROSEMIDE 10 MG/ML
20 INJECTION INTRAMUSCULAR; INTRAVENOUS EVERY 12 HOURS
Status: DISCONTINUED | OUTPATIENT
Start: 2018-01-01 | End: 2018-01-01

## 2018-01-01 RX ORDER — LORAZEPAM 0.5 MG/1
0.5 TABLET ORAL
Status: DISCONTINUED | OUTPATIENT
Start: 2018-01-01 | End: 2018-01-01 | Stop reason: HOSPADM

## 2018-01-01 RX ORDER — POTASSIUM CHLORIDE 1.5 G/1.77G
20 POWDER, FOR SOLUTION ORAL DAILY
Status: DISCONTINUED | OUTPATIENT
Start: 2018-01-01 | End: 2018-01-01 | Stop reason: CLARIF

## 2018-01-01 RX ORDER — ASPIRIN 81 MG/1
81 TABLET, CHEWABLE ORAL DAILY
Qty: 30 TABLET | Refills: 0 | Status: SHIPPED | OUTPATIENT
Start: 2018-01-01 | End: 2018-02-24

## 2018-01-01 RX ORDER — ACETAMINOPHEN 160 MG/5ML
650 SOLUTION ORAL EVERY 4 HOURS PRN
Status: DISCONTINUED | OUTPATIENT
Start: 2018-01-01 | End: 2018-01-01 | Stop reason: HOSPADM

## 2018-01-01 RX ORDER — POTASSIUM CHLORIDE 1.5 G/1.77G
40 POWDER, FOR SOLUTION ORAL ONCE
Status: COMPLETED | OUTPATIENT
Start: 2018-01-01 | End: 2018-01-01

## 2018-01-01 RX ORDER — LORAZEPAM 1 MG/1
2 TABLET ORAL
Status: DISCONTINUED | OUTPATIENT
Start: 2018-01-01 | End: 2018-01-01 | Stop reason: HOSPADM

## 2018-01-01 RX ORDER — FUROSEMIDE 20 MG/1
20 TABLET ORAL
Status: DISCONTINUED | OUTPATIENT
Start: 2018-01-01 | End: 2018-01-01

## 2018-01-01 RX ORDER — DONEPEZIL HYDROCHLORIDE 10 MG/1
10 TABLET, FILM COATED ORAL NIGHTLY
Status: DISCONTINUED | OUTPATIENT
Start: 2018-01-01 | End: 2018-01-01 | Stop reason: HOSPADM

## 2018-01-01 RX ORDER — PROMETHAZINE HYDROCHLORIDE 12.5 MG/1
6.25 SUPPOSITORY RECTAL EVERY 4 HOURS PRN
Status: DISCONTINUED | OUTPATIENT
Start: 2018-01-01 | End: 2018-01-01 | Stop reason: HOSPADM

## 2018-01-01 RX ORDER — ACETAMINOPHEN 650 MG/1
650 SUPPOSITORY RECTAL EVERY 4 HOURS PRN
Status: DISCONTINUED | OUTPATIENT
Start: 2018-01-01 | End: 2018-01-01 | Stop reason: HOSPADM

## 2018-01-01 RX ORDER — SODIUM CHLORIDE 0.9 % (FLUSH) 0.9 %
10 SYRINGE (ML) INJECTION AS NEEDED
Status: DISCONTINUED | OUTPATIENT
Start: 2018-01-01 | End: 2018-01-01 | Stop reason: HOSPADM

## 2018-01-01 RX ORDER — LORAZEPAM 1 MG/1
2 TABLET ORAL
Status: CANCELLED | OUTPATIENT
Start: 2018-01-01 | End: 2018-02-11

## 2018-01-01 RX ORDER — HYDROCODONE BITARTRATE AND ACETAMINOPHEN 5; 325 MG/1; MG/1
1 TABLET ORAL
COMMUNITY
End: 2018-01-01 | Stop reason: HOSPADM

## 2018-01-01 RX ORDER — ATORVASTATIN CALCIUM 20 MG/1
40 TABLET, FILM COATED ORAL NIGHTLY
Status: DISCONTINUED | OUTPATIENT
Start: 2018-01-01 | End: 2018-01-01

## 2018-01-01 RX ORDER — PROMETHAZINE HYDROCHLORIDE 25 MG/ML
6.25 INJECTION, SOLUTION INTRAMUSCULAR; INTRAVENOUS EVERY 4 HOURS PRN
Status: DISCONTINUED | OUTPATIENT
Start: 2018-01-01 | End: 2018-01-01 | Stop reason: HOSPADM

## 2018-01-01 RX ORDER — IPRATROPIUM BROMIDE AND ALBUTEROL SULFATE 2.5; .5 MG/3ML; MG/3ML
3 SOLUTION RESPIRATORY (INHALATION) ONCE
Status: COMPLETED | OUTPATIENT
Start: 2018-01-01 | End: 2018-01-01

## 2018-01-01 RX ORDER — DIVALPROEX SODIUM 125 MG/1
125 CAPSULE, COATED PELLETS ORAL NIGHTLY
Status: DISCONTINUED | OUTPATIENT
Start: 2018-01-01 | End: 2018-01-01

## 2018-01-01 RX ORDER — GLYCOPYRROLATE 0.2 MG/ML
0.2 INJECTION INTRAMUSCULAR; INTRAVENOUS
Status: DISCONTINUED | OUTPATIENT
Start: 2018-01-01 | End: 2018-01-01 | Stop reason: HOSPADM

## 2018-01-01 RX ORDER — WARFARIN SODIUM 2.5 MG/1
2.5 TABLET ORAL
COMMUNITY
End: 2018-01-01 | Stop reason: HOSPADM

## 2018-01-01 RX ORDER — RISPERIDONE 0.25 MG/1
0.25 TABLET ORAL DAILY
Status: DISCONTINUED | OUTPATIENT
Start: 2018-01-01 | End: 2018-01-01

## 2018-01-01 RX ORDER — WARFARIN SODIUM 5 MG/1
5 TABLET ORAL
Status: COMPLETED | OUTPATIENT
Start: 2018-01-01 | End: 2018-01-01

## 2018-01-01 RX ORDER — ASPIRIN 81 MG/1
81 TABLET, CHEWABLE ORAL DAILY
Status: DISCONTINUED | OUTPATIENT
Start: 2018-01-01 | End: 2018-01-01 | Stop reason: HOSPADM

## 2018-01-01 RX ORDER — IPRATROPIUM BROMIDE AND ALBUTEROL SULFATE 2.5; .5 MG/3ML; MG/3ML
3 SOLUTION RESPIRATORY (INHALATION)
Status: DISCONTINUED | OUTPATIENT
Start: 2018-01-01 | End: 2018-01-01

## 2018-01-01 RX ORDER — FUROSEMIDE 20 MG/1
20 TABLET ORAL DAILY
Status: DISCONTINUED | OUTPATIENT
Start: 2018-01-01 | End: 2018-01-01

## 2018-01-01 RX ORDER — LORAZEPAM 2 MG/ML
1 INJECTION INTRAMUSCULAR
Status: CANCELLED | OUTPATIENT
Start: 2018-01-01 | End: 2018-02-11

## 2018-01-01 RX ORDER — POTASSIUM CHLORIDE 750 MG/1
20 CAPSULE, EXTENDED RELEASE ORAL 2 TIMES DAILY WITH MEALS
Status: DISCONTINUED | OUTPATIENT
Start: 2018-01-01 | End: 2018-01-01

## 2018-01-01 RX ORDER — ISOSORBIDE DINITRATE 10 MG/1
10 TABLET ORAL EVERY 8 HOURS SCHEDULED
Status: DISCONTINUED | OUTPATIENT
Start: 2018-01-01 | End: 2018-01-01

## 2018-01-01 RX ORDER — ACETAMINOPHEN 160 MG/5ML
650 SOLUTION ORAL EVERY 4 HOURS PRN
Status: CANCELLED | OUTPATIENT
Start: 2018-01-01

## 2018-01-01 RX ORDER — DONEPEZIL HYDROCHLORIDE 10 MG/1
10 TABLET, FILM COATED ORAL NIGHTLY
Status: DISCONTINUED | OUTPATIENT
Start: 2018-01-01 | End: 2018-01-01

## 2018-01-01 RX ORDER — GLYCOPYRROLATE 0.2 MG/ML
0.4 INJECTION INTRAMUSCULAR; INTRAVENOUS
Status: CANCELLED | OUTPATIENT
Start: 2018-01-01

## 2018-01-01 RX ORDER — PROMETHAZINE HYDROCHLORIDE 25 MG/ML
6.25 INJECTION, SOLUTION INTRAMUSCULAR; INTRAVENOUS EVERY 4 HOURS PRN
Status: CANCELLED | OUTPATIENT
Start: 2018-01-01

## 2018-01-01 RX ORDER — RISPERIDONE 0.25 MG/1
0.25 TABLET ORAL DAILY
COMMUNITY

## 2018-01-01 RX ORDER — POTASSIUM CHLORIDE 1.5 G/1.77G
20 POWDER, FOR SOLUTION ORAL 2 TIMES DAILY
COMMUNITY

## 2018-01-01 RX ORDER — LORAZEPAM 2 MG/ML
2 CONCENTRATE ORAL
Status: CANCELLED | OUTPATIENT
Start: 2018-01-01 | End: 2018-02-11

## 2018-01-01 RX ORDER — PANTOPRAZOLE SODIUM 40 MG/1
40 TABLET, DELAYED RELEASE ORAL
Status: DISCONTINUED | OUTPATIENT
Start: 2018-01-01 | End: 2018-01-01 | Stop reason: HOSPADM

## 2018-01-01 RX ORDER — ACETAMINOPHEN 325 MG/1
650 TABLET ORAL EVERY 4 HOURS PRN
Status: DISCONTINUED | OUTPATIENT
Start: 2018-01-01 | End: 2018-01-01 | Stop reason: HOSPADM

## 2018-01-01 RX ORDER — LORAZEPAM 2 MG/ML
2 CONCENTRATE ORAL
Status: DISCONTINUED | OUTPATIENT
Start: 2018-01-01 | End: 2018-01-01 | Stop reason: HOSPADM

## 2018-01-01 RX ORDER — LORAZEPAM 0.5 MG/1
0.5 TABLET ORAL
Status: CANCELLED | OUTPATIENT
Start: 2018-01-01 | End: 2018-02-11

## 2018-01-01 RX ORDER — PROMETHAZINE HYDROCHLORIDE 6.25 MG/5ML
6.25 SYRUP ORAL EVERY 4 HOURS PRN
Status: CANCELLED | OUTPATIENT
Start: 2018-01-01

## 2018-01-01 RX ORDER — LORAZEPAM 2 MG/ML
1 INJECTION INTRAMUSCULAR
Status: DISCONTINUED | OUTPATIENT
Start: 2018-01-01 | End: 2018-01-01 | Stop reason: HOSPADM

## 2018-01-01 RX ORDER — ISOSORBIDE DINITRATE 5 MG/1
5 TABLET ORAL EVERY 8 HOURS SCHEDULED
Status: DISCONTINUED | OUTPATIENT
Start: 2018-01-01 | End: 2018-01-01 | Stop reason: HOSPADM

## 2018-01-01 RX ORDER — RISPERIDONE 0.25 MG/1
0.25 TABLET ORAL DAILY
Status: DISCONTINUED | OUTPATIENT
Start: 2018-01-01 | End: 2018-01-01 | Stop reason: HOSPADM

## 2018-01-01 RX ORDER — ISOSORBIDE DINITRATE 5 MG/1
5 TABLET ORAL EVERY 8 HOURS SCHEDULED
Qty: 90 TABLET | Refills: 0 | Status: SHIPPED | OUTPATIENT
Start: 2018-01-01 | End: 2018-02-23

## 2018-01-01 RX ORDER — WARFARIN SODIUM 7.5 MG/1
7.5 TABLET ORAL
Status: COMPLETED | OUTPATIENT
Start: 2018-01-01 | End: 2018-01-01

## 2018-01-01 RX ORDER — GLYCOPYRROLATE 0.2 MG/ML
0.4 INJECTION INTRAMUSCULAR; INTRAVENOUS
Status: DISCONTINUED | OUTPATIENT
Start: 2018-01-01 | End: 2018-01-01 | Stop reason: HOSPADM

## 2018-01-01 RX ORDER — PANTOPRAZOLE SODIUM 40 MG/1
40 TABLET, DELAYED RELEASE ORAL DAILY
Qty: 30 TABLET | Refills: 0 | Status: SHIPPED | OUTPATIENT
Start: 2018-01-01 | End: 2018-02-23

## 2018-01-01 RX ORDER — MORPHINE SULFATE 20 MG/ML
10 SOLUTION ORAL
Status: DISCONTINUED | OUTPATIENT
Start: 2018-01-01 | End: 2018-01-01 | Stop reason: HOSPADM

## 2018-01-01 RX ORDER — FUROSEMIDE 20 MG/1
20 TABLET ORAL
Status: DISCONTINUED | OUTPATIENT
Start: 2018-01-01 | End: 2018-01-01 | Stop reason: HOSPADM

## 2018-01-01 RX ORDER — CETIRIZINE HYDROCHLORIDE 10 MG/1
10 TABLET ORAL DAILY
COMMUNITY
End: 2018-01-01 | Stop reason: HOSPADM

## 2018-01-01 RX ORDER — MORPHINE SULFATE 2 MG/ML
2 INJECTION, SOLUTION INTRAMUSCULAR; INTRAVENOUS
Status: CANCELLED | OUTPATIENT
Start: 2018-01-01 | End: 2018-02-11

## 2018-01-01 RX ORDER — METHYLPREDNISOLONE SODIUM SUCCINATE 125 MG/2ML
125 INJECTION, POWDER, LYOPHILIZED, FOR SOLUTION INTRAMUSCULAR; INTRAVENOUS ONCE
Status: COMPLETED | OUTPATIENT
Start: 2018-01-01 | End: 2018-01-01

## 2018-01-01 RX ORDER — DONEPEZIL HYDROCHLORIDE 10 MG/1
10 TABLET, FILM COATED ORAL NIGHTLY
COMMUNITY

## 2018-01-01 RX ORDER — ACETAMINOPHEN 325 MG/1
650 TABLET ORAL EVERY 4 HOURS PRN
Status: CANCELLED | OUTPATIENT
Start: 2018-01-01

## 2018-01-01 RX ORDER — ATORVASTATIN CALCIUM 10 MG/1
10 TABLET, FILM COATED ORAL NIGHTLY
Status: DISCONTINUED | OUTPATIENT
Start: 2018-01-01 | End: 2018-01-01 | Stop reason: HOSPADM

## 2018-01-01 RX ORDER — WARFARIN SODIUM 7.5 MG/1
7.5 TABLET ORAL
Status: DISCONTINUED | OUTPATIENT
Start: 2018-01-01 | End: 2018-01-01

## 2018-01-01 RX ORDER — LORAZEPAM 1 MG/1
1 TABLET ORAL
Status: CANCELLED | OUTPATIENT
Start: 2018-01-01 | End: 2018-02-11

## 2018-01-01 RX ORDER — POTASSIUM CHLORIDE 1.5 G/1.77G
20 POWDER, FOR SOLUTION ORAL 2 TIMES DAILY
Status: DISCONTINUED | OUTPATIENT
Start: 2018-01-01 | End: 2018-01-01 | Stop reason: HOSPADM

## 2018-01-01 RX ORDER — POTASSIUM CHLORIDE 750 MG/1
40 CAPSULE, EXTENDED RELEASE ORAL ONCE
Status: DISCONTINUED | OUTPATIENT
Start: 2018-01-01 | End: 2018-01-01

## 2018-01-01 RX ORDER — LORAZEPAM 2 MG/ML
2 INJECTION INTRAMUSCULAR
Status: CANCELLED | OUTPATIENT
Start: 2018-01-01 | End: 2018-02-11

## 2018-01-01 RX ORDER — CEFTRIAXONE SODIUM 1 G/50ML
1 INJECTION, SOLUTION INTRAVENOUS EVERY 24 HOURS
Status: DISCONTINUED | OUTPATIENT
Start: 2018-01-01 | End: 2018-01-01

## 2018-01-01 RX ORDER — DIVALPROEX SODIUM 125 MG/1
125 CAPSULE, COATED PELLETS ORAL 2 TIMES DAILY
COMMUNITY

## 2018-01-01 RX ORDER — PROMETHAZINE HYDROCHLORIDE 25 MG/1
6.25 TABLET ORAL EVERY 4 HOURS PRN
Status: DISCONTINUED | OUTPATIENT
Start: 2018-01-01 | End: 2018-01-01 | Stop reason: HOSPADM

## 2018-01-01 RX ORDER — DIVALPROEX SODIUM 125 MG/1
125 CAPSULE, COATED PELLETS ORAL EVERY 12 HOURS SCHEDULED
Status: DISCONTINUED | OUTPATIENT
Start: 2018-01-01 | End: 2018-01-01 | Stop reason: HOSPADM

## 2018-01-01 RX ORDER — WARFARIN SODIUM 2.5 MG/1
2.5 TABLET ORAL
Status: DISCONTINUED | OUTPATIENT
Start: 2018-01-01 | End: 2018-01-01

## 2018-01-01 RX ORDER — FUROSEMIDE 20 MG/1
20 TABLET ORAL DAILY
COMMUNITY
End: 2018-01-01 | Stop reason: HOSPADM

## 2018-01-01 RX ORDER — FUROSEMIDE 20 MG/1
20 TABLET ORAL
Qty: 60 TABLET | Refills: 0 | Status: SHIPPED | OUTPATIENT
Start: 2018-01-01 | End: 2018-02-23

## 2018-01-01 RX ORDER — MORPHINE SULFATE 20 MG/ML
10 SOLUTION ORAL
Status: CANCELLED | OUTPATIENT
Start: 2018-01-01 | End: 2018-02-11

## 2018-01-01 RX ORDER — ATORVASTATIN CALCIUM 10 MG/1
10 TABLET, FILM COATED ORAL NIGHTLY
Status: DISCONTINUED | OUTPATIENT
Start: 2018-01-01 | End: 2018-01-01

## 2018-01-01 RX ORDER — PROMETHAZINE HYDROCHLORIDE 12.5 MG/1
6.25 SUPPOSITORY RECTAL EVERY 4 HOURS PRN
Status: CANCELLED | OUTPATIENT
Start: 2018-01-01

## 2018-01-01 RX ORDER — ATORVASTATIN CALCIUM 10 MG/1
10 TABLET, FILM COATED ORAL NIGHTLY
Qty: 30 TABLET | Refills: 0 | Status: SHIPPED | OUTPATIENT
Start: 2018-01-01 | End: 2018-02-23

## 2018-01-01 RX ADMIN — MORPHINE SULFATE 2 MG: 2 INJECTION, SOLUTION INTRAMUSCULAR; INTRAVENOUS at 12:53

## 2018-01-01 RX ADMIN — GLYCOPYRROLATE 0.4 MG: 0.2 INJECTION, SOLUTION INTRAMUSCULAR; INTRAVENOUS at 12:30

## 2018-01-01 RX ADMIN — MORPHINE SULFATE 4 MG: 10 INJECTION, SOLUTION INTRAMUSCULAR; INTRAVENOUS at 06:34

## 2018-01-01 RX ADMIN — ASPIRIN 81 MG: 81 TABLET, CHEWABLE ORAL at 09:49

## 2018-01-01 RX ADMIN — LORAZEPAM 1 MG: 2 INJECTION INTRAMUSCULAR; INTRAVENOUS at 05:28

## 2018-01-01 RX ADMIN — MORPHINE SULFATE 2 MG: 2 INJECTION, SOLUTION INTRAMUSCULAR; INTRAVENOUS at 05:35

## 2018-01-01 RX ADMIN — GLYCOPYRROLATE 0.4 MG: 0.2 INJECTION, SOLUTION INTRAMUSCULAR; INTRAVENOUS at 03:46

## 2018-01-01 RX ADMIN — MORPHINE SULFATE 2 MG: 2 INJECTION, SOLUTION INTRAMUSCULAR; INTRAVENOUS at 20:52

## 2018-01-01 RX ADMIN — LORAZEPAM 1 MG: 2 INJECTION INTRAMUSCULAR; INTRAVENOUS at 12:15

## 2018-01-01 RX ADMIN — MORPHINE SULFATE 2 MG: 10 INJECTION, SOLUTION INTRAMUSCULAR; INTRAVENOUS at 16:40

## 2018-01-01 RX ADMIN — ISOSORBIDE DINITRATE 5 MG: 5 TABLET ORAL at 21:06

## 2018-01-01 RX ADMIN — ISOSORBIDE DINITRATE 5 MG: 5 TABLET ORAL at 05:39

## 2018-01-01 RX ADMIN — GLYCOPYRROLATE 0.4 MG: 0.2 INJECTION, SOLUTION INTRAMUSCULAR; INTRAVENOUS at 20:20

## 2018-01-01 RX ADMIN — MORPHINE SULFATE 4 MG: 4 INJECTION, SOLUTION INTRAMUSCULAR; INTRAVENOUS at 16:26

## 2018-01-01 RX ADMIN — ASPIRIN 81 MG: 81 TABLET, CHEWABLE ORAL at 08:24

## 2018-01-01 RX ADMIN — LORAZEPAM 1 MG: 2 INJECTION INTRAMUSCULAR; INTRAVENOUS at 01:36

## 2018-01-01 RX ADMIN — LORAZEPAM 2 MG: 2 INJECTION INTRAMUSCULAR; INTRAVENOUS at 06:34

## 2018-01-01 RX ADMIN — FUROSEMIDE 20 MG: 20 TABLET ORAL at 17:10

## 2018-01-01 RX ADMIN — PANTOPRAZOLE SODIUM 40 MG: 40 TABLET, DELAYED RELEASE ORAL at 06:18

## 2018-01-01 RX ADMIN — FUROSEMIDE 40 MG: 10 INJECTION, SOLUTION INTRAMUSCULAR; INTRAVENOUS at 17:49

## 2018-01-01 RX ADMIN — ASPIRIN 81 MG: 81 TABLET, CHEWABLE ORAL at 08:51

## 2018-01-01 RX ADMIN — IPRATROPIUM BROMIDE AND ALBUTEROL SULFATE 3 ML: .5; 3 SOLUTION RESPIRATORY (INHALATION) at 23:03

## 2018-01-01 RX ADMIN — DIVALPROEX SODIUM 125 MG: 125 CAPSULE, COATED PELLETS ORAL at 20:44

## 2018-01-01 RX ADMIN — MORPHINE SULFATE 2 MG: 2 INJECTION, SOLUTION INTRAMUSCULAR; INTRAVENOUS at 14:14

## 2018-01-01 RX ADMIN — DONEPEZIL HYDROCHLORIDE 10 MG: 10 TABLET, FILM COATED ORAL at 20:02

## 2018-01-01 RX ADMIN — DIVALPROEX SODIUM 125 MG: 125 CAPSULE, COATED PELLETS ORAL at 21:32

## 2018-01-01 RX ADMIN — POTASSIUM CHLORIDE 20 MEQ: 750 CAPSULE, EXTENDED RELEASE ORAL at 17:16

## 2018-01-01 RX ADMIN — FUROSEMIDE 40 MG: 10 INJECTION, SOLUTION INTRAMUSCULAR; INTRAVENOUS at 16:08

## 2018-01-01 RX ADMIN — GLYCOPYRROLATE 0.4 MG: 0.2 INJECTION, SOLUTION INTRAMUSCULAR; INTRAVENOUS at 16:26

## 2018-01-01 RX ADMIN — MORPHINE SULFATE 2 MG: 10 INJECTION, SOLUTION INTRAMUSCULAR; INTRAVENOUS at 01:36

## 2018-01-01 RX ADMIN — PROMETHAZINE HYDROCHLORIDE 6.25 MG: 12.5 SUPPOSITORY RECTAL at 16:09

## 2018-01-01 RX ADMIN — IPRATROPIUM BROMIDE AND ALBUTEROL SULFATE 3 ML: .5; 3 SOLUTION RESPIRATORY (INHALATION) at 19:55

## 2018-01-01 RX ADMIN — ENOXAPARIN SODIUM 110 MG: 120 INJECTION SUBCUTANEOUS at 14:15

## 2018-01-01 RX ADMIN — DIVALPROEX SODIUM 125 MG: 125 CAPSULE, COATED PELLETS ORAL at 08:46

## 2018-01-01 RX ADMIN — WARFARIN SODIUM 1 MG: 1 TABLET ORAL at 16:13

## 2018-01-01 RX ADMIN — FUROSEMIDE 20 MG: 20 TABLET ORAL at 18:24

## 2018-01-01 RX ADMIN — ASPIRIN 81 MG: 81 TABLET, CHEWABLE ORAL at 08:46

## 2018-01-01 RX ADMIN — MORPHINE SULFATE 4 MG: 4 INJECTION, SOLUTION INTRAMUSCULAR; INTRAVENOUS at 00:21

## 2018-01-01 RX ADMIN — MORPHINE SULFATE 2 MG: 10 INJECTION, SOLUTION INTRAMUSCULAR; INTRAVENOUS at 04:28

## 2018-01-01 RX ADMIN — RISPERIDONE 0.25 MG: 0.25 TABLET, FILM COATED ORAL at 09:59

## 2018-01-01 RX ADMIN — GLYCOPYRROLATE 0.4 MG: 0.2 INJECTION, SOLUTION INTRAMUSCULAR; INTRAVENOUS at 08:38

## 2018-01-01 RX ADMIN — IPRATROPIUM BROMIDE AND ALBUTEROL SULFATE 3 ML: .5; 3 SOLUTION RESPIRATORY (INHALATION) at 14:15

## 2018-01-01 RX ADMIN — IPRATROPIUM BROMIDE AND ALBUTEROL SULFATE 3 ML: .5; 3 SOLUTION RESPIRATORY (INHALATION) at 23:39

## 2018-01-01 RX ADMIN — IPRATROPIUM BROMIDE AND ALBUTEROL SULFATE 3 ML: .5; 3 SOLUTION RESPIRATORY (INHALATION) at 23:16

## 2018-01-01 RX ADMIN — ATORVASTATIN CALCIUM 10 MG: 10 TABLET, FILM COATED ORAL at 22:28

## 2018-01-01 RX ADMIN — MORPHINE SULFATE 2 MG: 10 INJECTION, SOLUTION INTRAMUSCULAR; INTRAVENOUS at 12:15

## 2018-01-01 RX ADMIN — MORPHINE SULFATE 2 MG: 10 INJECTION, SOLUTION INTRAMUSCULAR; INTRAVENOUS at 18:41

## 2018-01-01 RX ADMIN — ATORVASTATIN CALCIUM 10 MG: 10 TABLET, FILM COATED ORAL at 20:02

## 2018-01-01 RX ADMIN — IPRATROPIUM BROMIDE AND ALBUTEROL SULFATE 3 ML: .5; 3 SOLUTION RESPIRATORY (INHALATION) at 07:54

## 2018-01-01 RX ADMIN — DIVALPROEX SODIUM 125 MG: 125 CAPSULE, COATED PELLETS ORAL at 20:02

## 2018-01-01 RX ADMIN — WARFARIN SODIUM 5 MG: 5 TABLET ORAL at 20:44

## 2018-01-01 RX ADMIN — MORPHINE SULFATE 2 MG: 10 INJECTION, SOLUTION INTRAMUSCULAR; INTRAVENOUS at 13:22

## 2018-01-01 RX ADMIN — ISOSORBIDE DINITRATE 5 MG: 5 TABLET ORAL at 21:18

## 2018-01-01 RX ADMIN — ASPIRIN 81 MG: 81 TABLET, CHEWABLE ORAL at 12:16

## 2018-01-01 RX ADMIN — IPRATROPIUM BROMIDE AND ALBUTEROL SULFATE 3 ML: .5; 3 SOLUTION RESPIRATORY (INHALATION) at 10:11

## 2018-01-01 RX ADMIN — LORAZEPAM 0.5 MG: 2 INJECTION INTRAMUSCULAR; INTRAVENOUS at 14:50

## 2018-01-01 RX ADMIN — PANTOPRAZOLE SODIUM 40 MG: 40 TABLET, DELAYED RELEASE ORAL at 10:14

## 2018-01-01 RX ADMIN — MORPHINE SULFATE 4 MG: 4 INJECTION, SOLUTION INTRAMUSCULAR; INTRAVENOUS at 12:30

## 2018-01-01 RX ADMIN — IPRATROPIUM BROMIDE AND ALBUTEROL SULFATE 3 ML: .5; 3 SOLUTION RESPIRATORY (INHALATION) at 20:42

## 2018-01-01 RX ADMIN — CEFTRIAXONE SODIUM 1 G: 1 INJECTION, SOLUTION INTRAVENOUS at 15:49

## 2018-01-01 RX ADMIN — LORAZEPAM 2 MG: 2 INJECTION INTRAMUSCULAR; INTRAVENOUS at 16:09

## 2018-01-01 RX ADMIN — POTASSIUM CHLORIDE 20 MEQ: 750 CAPSULE, EXTENDED RELEASE ORAL at 12:15

## 2018-01-01 RX ADMIN — GLYCOPYRROLATE 0.4 MG: 0.2 INJECTION, SOLUTION INTRAMUSCULAR; INTRAVENOUS at 11:56

## 2018-01-01 RX ADMIN — POTASSIUM CHLORIDE 20 MEQ: 750 CAPSULE, EXTENDED RELEASE ORAL at 17:30

## 2018-01-01 RX ADMIN — MORPHINE SULFATE 2 MG: 2 INJECTION, SOLUTION INTRAMUSCULAR; INTRAVENOUS at 02:06

## 2018-01-01 RX ADMIN — DIVALPROEX SODIUM 125 MG: 125 CAPSULE, COATED PELLETS ORAL at 21:18

## 2018-01-01 RX ADMIN — FUROSEMIDE 20 MG: 10 INJECTION, SOLUTION INTRAMUSCULAR; INTRAVENOUS at 05:07

## 2018-01-01 RX ADMIN — GLYCOPYRROLATE 0.4 MG: 0.2 INJECTION, SOLUTION INTRAMUSCULAR; INTRAVENOUS at 08:07

## 2018-01-01 RX ADMIN — MORPHINE SULFATE 4 MG: 4 INJECTION, SOLUTION INTRAMUSCULAR; INTRAVENOUS at 08:38

## 2018-01-01 RX ADMIN — NITROGLYCERIN 0.5 INCH: 20 OINTMENT TOPICAL at 12:26

## 2018-01-01 RX ADMIN — PANTOPRAZOLE SODIUM 40 MG: 40 TABLET, DELAYED RELEASE ORAL at 06:57

## 2018-01-01 RX ADMIN — ATORVASTATIN CALCIUM 10 MG: 10 TABLET, FILM COATED ORAL at 21:05

## 2018-01-01 RX ADMIN — WARFARIN SODIUM 7.5 MG: 7.5 TABLET ORAL at 17:30

## 2018-01-01 RX ADMIN — ISOSORBIDE DINITRATE 5 MG: 5 TABLET ORAL at 14:34

## 2018-01-01 RX ADMIN — IPRATROPIUM BROMIDE AND ALBUTEROL SULFATE 3 ML: .5; 3 SOLUTION RESPIRATORY (INHALATION) at 07:12

## 2018-01-01 RX ADMIN — MORPHINE SULFATE 2 MG: 10 INJECTION, SOLUTION INTRAMUSCULAR; INTRAVENOUS at 20:19

## 2018-01-01 RX ADMIN — DIVALPROEX SODIUM 125 MG: 125 CAPSULE, COATED PELLETS ORAL at 01:08

## 2018-01-01 RX ADMIN — SODIUM CHLORIDE 500 ML: 9 INJECTION, SOLUTION INTRAVENOUS at 11:49

## 2018-01-01 RX ADMIN — MORPHINE SULFATE 2 MG: 2 INJECTION, SOLUTION INTRAMUSCULAR; INTRAVENOUS at 09:42

## 2018-01-01 RX ADMIN — MORPHINE SULFATE 2 MG: 2 INJECTION, SOLUTION INTRAMUSCULAR; INTRAVENOUS at 15:11

## 2018-01-01 RX ADMIN — FUROSEMIDE 40 MG: 10 INJECTION, SOLUTION INTRAMUSCULAR; INTRAVENOUS at 04:22

## 2018-01-01 RX ADMIN — DIVALPROEX SODIUM 125 MG: 125 CAPSULE, COATED PELLETS ORAL at 22:28

## 2018-01-01 RX ADMIN — IPRATROPIUM BROMIDE AND ALBUTEROL SULFATE 3 ML: .5; 3 SOLUTION RESPIRATORY (INHALATION) at 07:31

## 2018-01-01 RX ADMIN — POTASSIUM CHLORIDE 20 MEQ: 750 CAPSULE, EXTENDED RELEASE ORAL at 10:15

## 2018-01-01 RX ADMIN — LORAZEPAM 2 MG: 2 INJECTION INTRAMUSCULAR; INTRAVENOUS at 08:38

## 2018-01-01 RX ADMIN — POTASSIUM CHLORIDE 20 MEQ: 750 CAPSULE, EXTENDED RELEASE ORAL at 08:51

## 2018-01-01 RX ADMIN — FUROSEMIDE 20 MG: 20 TABLET ORAL at 08:51

## 2018-01-01 RX ADMIN — WARFARIN SODIUM 1 MG: 1 TABLET ORAL at 18:24

## 2018-01-01 RX ADMIN — PANTOPRAZOLE SODIUM 40 MG: 40 TABLET, DELAYED RELEASE ORAL at 08:27

## 2018-01-01 RX ADMIN — RISPERIDONE 0.25 MG: 0.25 TABLET, FILM COATED ORAL at 10:15

## 2018-01-01 RX ADMIN — DONEPEZIL HYDROCHLORIDE 10 MG: 10 TABLET, FILM COATED ORAL at 21:55

## 2018-01-01 RX ADMIN — ATORVASTATIN CALCIUM 10 MG: 10 TABLET, FILM COATED ORAL at 21:18

## 2018-01-01 RX ADMIN — ATROPINE SULFATE 0.5 MG: 0.1 INJECTION, SOLUTION INTRAVENOUS at 11:32

## 2018-01-01 RX ADMIN — RISPERIDONE 0.25 MG: 0.25 TABLET, FILM COATED ORAL at 12:15

## 2018-01-01 RX ADMIN — PANTOPRAZOLE SODIUM 40 MG: 40 TABLET, DELAYED RELEASE ORAL at 09:59

## 2018-01-01 RX ADMIN — IPRATROPIUM BROMIDE AND ALBUTEROL SULFATE 3 ML: .5; 3 SOLUTION RESPIRATORY (INHALATION) at 22:24

## 2018-01-01 RX ADMIN — LORAZEPAM 1 MG: 2 INJECTION INTRAMUSCULAR; INTRAVENOUS at 13:22

## 2018-01-01 RX ADMIN — MORPHINE SULFATE 2 MG: 10 INJECTION, SOLUTION INTRAMUSCULAR; INTRAVENOUS at 09:07

## 2018-01-01 RX ADMIN — ASPIRIN 300 MG: 300 SUPPOSITORY RECTAL at 08:45

## 2018-01-01 RX ADMIN — RISPERIDONE 0.25 MG: 0.25 TABLET, FILM COATED ORAL at 08:51

## 2018-01-01 RX ADMIN — ISOSORBIDE DINITRATE 5 MG: 5 TABLET ORAL at 06:18

## 2018-01-01 RX ADMIN — MORPHINE SULFATE 2 MG: 10 INJECTION, SOLUTION INTRAMUSCULAR; INTRAVENOUS at 16:30

## 2018-01-01 RX ADMIN — MORPHINE SULFATE 2 MG: 2 INJECTION, SOLUTION INTRAMUSCULAR; INTRAVENOUS at 01:25

## 2018-01-01 RX ADMIN — GLYCOPYRROLATE 0.4 MG: 0.2 INJECTION, SOLUTION INTRAMUSCULAR; INTRAVENOUS at 18:48

## 2018-01-01 RX ADMIN — IPRATROPIUM BROMIDE AND ALBUTEROL SULFATE 3 ML: .5; 3 SOLUTION RESPIRATORY (INHALATION) at 03:35

## 2018-01-01 RX ADMIN — GLYCOPYRROLATE 0.4 MG: 0.2 INJECTION, SOLUTION INTRAMUSCULAR; INTRAVENOUS at 00:21

## 2018-01-01 RX ADMIN — RISPERIDONE 0.25 MG: 0.25 TABLET, FILM COATED ORAL at 09:49

## 2018-01-01 RX ADMIN — ASPIRIN 81 MG: 81 TABLET, CHEWABLE ORAL at 10:15

## 2018-01-01 RX ADMIN — LORAZEPAM 1 MG: 2 INJECTION INTRAMUSCULAR; INTRAVENOUS at 16:41

## 2018-01-01 RX ADMIN — IPRATROPIUM BROMIDE AND ALBUTEROL SULFATE 3 ML: .5; 3 SOLUTION RESPIRATORY (INHALATION) at 07:33

## 2018-01-01 RX ADMIN — DONEPEZIL HYDROCHLORIDE 10 MG: 10 TABLET, FILM COATED ORAL at 21:32

## 2018-01-01 RX ADMIN — DIVALPROEX SODIUM 125 MG: 125 CAPSULE, COATED PELLETS ORAL at 09:49

## 2018-01-01 RX ADMIN — LORAZEPAM 0.5 MG: 2 INJECTION INTRAMUSCULAR; INTRAVENOUS at 02:05

## 2018-01-01 RX ADMIN — MORPHINE SULFATE 2 MG: 2 INJECTION, SOLUTION INTRAMUSCULAR; INTRAVENOUS at 09:26

## 2018-01-01 RX ADMIN — CEFTRIAXONE SODIUM 1 G: 1 INJECTION, SOLUTION INTRAVENOUS at 17:14

## 2018-01-01 RX ADMIN — ASPIRIN 81 MG: 81 TABLET, CHEWABLE ORAL at 09:58

## 2018-01-01 RX ADMIN — PANTOPRAZOLE SODIUM 40 MG: 40 TABLET, DELAYED RELEASE ORAL at 05:39

## 2018-01-01 RX ADMIN — FUROSEMIDE 40 MG: 10 INJECTION, SOLUTION INTRAMUSCULAR; INTRAVENOUS at 20:10

## 2018-01-01 RX ADMIN — ISOSORBIDE DINITRATE 5 MG: 5 TABLET ORAL at 23:26

## 2018-01-01 RX ADMIN — FUROSEMIDE 20 MG: 20 TABLET ORAL at 12:16

## 2018-01-01 RX ADMIN — ASPIRIN 300 MG: 300 SUPPOSITORY RECTAL at 12:25

## 2018-01-01 RX ADMIN — MORPHINE SULFATE 2 MG: 10 INJECTION, SOLUTION INTRAMUSCULAR; INTRAVENOUS at 05:28

## 2018-01-01 RX ADMIN — MORPHINE SULFATE 2 MG: 2 INJECTION, SOLUTION INTRAMUSCULAR; INTRAVENOUS at 00:50

## 2018-01-01 RX ADMIN — ISOSORBIDE DINITRATE 5 MG: 5 TABLET ORAL at 08:23

## 2018-01-01 RX ADMIN — CEFTRIAXONE SODIUM 1 G: 1 INJECTION, SOLUTION INTRAVENOUS at 16:09

## 2018-01-01 RX ADMIN — IPRATROPIUM BROMIDE AND ALBUTEROL SULFATE 3 ML: .5; 3 SOLUTION RESPIRATORY (INHALATION) at 10:41

## 2018-01-01 RX ADMIN — IPRATROPIUM BROMIDE AND ALBUTEROL SULFATE 3 ML: .5; 3 SOLUTION RESPIRATORY (INHALATION) at 07:18

## 2018-01-01 RX ADMIN — RISPERIDONE 0.25 MG: 0.25 TABLET, FILM COATED ORAL at 08:24

## 2018-01-01 RX ADMIN — LORAZEPAM 0.5 MG: 2 INJECTION INTRAMUSCULAR; INTRAVENOUS at 09:42

## 2018-01-01 RX ADMIN — DIVALPROEX SODIUM 125 MG: 125 CAPSULE, COATED PELLETS ORAL at 16:13

## 2018-01-01 RX ADMIN — LORAZEPAM 1 MG: 2 INJECTION INTRAMUSCULAR; INTRAVENOUS at 16:31

## 2018-01-01 RX ADMIN — IPRATROPIUM BROMIDE AND ALBUTEROL SULFATE 3 ML: .5; 3 SOLUTION RESPIRATORY (INHALATION) at 14:02

## 2018-01-01 RX ADMIN — MORPHINE SULFATE 2 MG: 10 INJECTION, SOLUTION INTRAMUSCULAR; INTRAVENOUS at 23:37

## 2018-01-01 RX ADMIN — IPRATROPIUM BROMIDE AND ALBUTEROL SULFATE 3 ML: .5; 3 SOLUTION RESPIRATORY (INHALATION) at 11:04

## 2018-01-01 RX ADMIN — WARFARIN SODIUM 1 MG: 1 TABLET ORAL at 17:14

## 2018-01-01 RX ADMIN — MORPHINE SULFATE 2 MG: 10 INJECTION, SOLUTION INTRAMUSCULAR; INTRAVENOUS at 20:29

## 2018-01-01 RX ADMIN — GLYCOPYRROLATE 0.4 MG: 0.2 INJECTION, SOLUTION INTRAMUSCULAR; INTRAVENOUS at 16:09

## 2018-01-01 RX ADMIN — MORPHINE SULFATE 2 MG: 2 INJECTION, SOLUTION INTRAMUSCULAR; INTRAVENOUS at 16:58

## 2018-01-01 RX ADMIN — LORAZEPAM 2 MG: 2 INJECTION INTRAMUSCULAR; INTRAVENOUS at 12:31

## 2018-01-01 RX ADMIN — MORPHINE SULFATE 2 MG: 2 INJECTION, SOLUTION INTRAMUSCULAR; INTRAVENOUS at 18:37

## 2018-01-01 RX ADMIN — IPRATROPIUM BROMIDE AND ALBUTEROL SULFATE 3 ML: .5; 3 SOLUTION RESPIRATORY (INHALATION) at 23:42

## 2018-01-01 RX ADMIN — MORPHINE SULFATE 2 MG: 10 INJECTION, SOLUTION INTRAMUSCULAR; INTRAVENOUS at 03:46

## 2018-01-01 RX ADMIN — MORPHINE SULFATE 4 MG: 4 INJECTION, SOLUTION INTRAMUSCULAR; INTRAVENOUS at 16:09

## 2018-01-01 RX ADMIN — CEFTRIAXONE SODIUM 1 G: 1 INJECTION, SOLUTION INTRAVENOUS at 17:49

## 2018-01-01 RX ADMIN — MORPHINE SULFATE 2 MG: 10 INJECTION, SOLUTION INTRAMUSCULAR; INTRAVENOUS at 08:20

## 2018-01-01 RX ADMIN — LORAZEPAM 0.5 MG: 2 INJECTION INTRAMUSCULAR; INTRAVENOUS at 18:37

## 2018-01-01 RX ADMIN — METHYLPREDNISOLONE SODIUM SUCCINATE 125 MG: 125 INJECTION, POWDER, FOR SOLUTION INTRAMUSCULAR; INTRAVENOUS at 18:33

## 2018-01-01 RX ADMIN — MORPHINE SULFATE 2 MG: 2 INJECTION, SOLUTION INTRAMUSCULAR; INTRAVENOUS at 00:44

## 2018-01-01 RX ADMIN — IPRATROPIUM BROMIDE AND ALBUTEROL SULFATE 3 ML: .5; 3 SOLUTION RESPIRATORY (INHALATION) at 18:59

## 2018-01-01 RX ADMIN — LORAZEPAM 1 MG: 2 INJECTION INTRAMUSCULAR; INTRAVENOUS at 08:20

## 2018-01-01 RX ADMIN — ISOSORBIDE DINITRATE 5 MG: 5 TABLET ORAL at 10:14

## 2018-01-01 RX ADMIN — RISPERIDONE 0.25 MG: 0.25 TABLET, FILM COATED ORAL at 08:46

## 2018-01-01 RX ADMIN — ISOSORBIDE DINITRATE 5 MG: 5 TABLET ORAL at 03:42

## 2018-01-01 RX ADMIN — LORAZEPAM 2 MG: 2 INJECTION INTRAMUSCULAR; INTRAVENOUS at 03:46

## 2018-01-01 RX ADMIN — ATORVASTATIN CALCIUM 10 MG: 10 TABLET, FILM COATED ORAL at 20:44

## 2018-01-01 RX ADMIN — ACETAMINOPHEN 650 MG: 325 TABLET ORAL at 08:17

## 2018-01-01 RX ADMIN — DONEPEZIL HYDROCHLORIDE 10 MG: 10 TABLET, FILM COATED ORAL at 21:18

## 2018-01-01 RX ADMIN — FUROSEMIDE 20 MG: 20 TABLET ORAL at 09:49

## 2018-01-01 RX ADMIN — IPRATROPIUM BROMIDE AND ALBUTEROL SULFATE 3 ML: .5; 3 SOLUTION RESPIRATORY (INHALATION) at 19:24

## 2018-01-01 RX ADMIN — DONEPEZIL HYDROCHLORIDE 10 MG: 10 TABLET, FILM COATED ORAL at 22:28

## 2018-01-01 RX ADMIN — IPRATROPIUM BROMIDE AND ALBUTEROL SULFATE 3 ML: .5; 3 SOLUTION RESPIRATORY (INHALATION) at 14:13

## 2018-01-01 RX ADMIN — FUROSEMIDE 20 MG: 20 TABLET ORAL at 09:58

## 2018-01-01 RX ADMIN — FUROSEMIDE 20 MG: 20 TABLET ORAL at 10:14

## 2018-01-01 RX ADMIN — FUROSEMIDE 40 MG: 10 INJECTION, SOLUTION INTRAMUSCULAR; INTRAVENOUS at 05:03

## 2018-01-01 RX ADMIN — LORAZEPAM 0.5 MG: 2 INJECTION INTRAMUSCULAR; INTRAVENOUS at 18:42

## 2018-01-01 RX ADMIN — MORPHINE SULFATE 2 MG: 2 INJECTION, SOLUTION INTRAMUSCULAR; INTRAVENOUS at 04:51

## 2018-01-01 RX ADMIN — IPRATROPIUM BROMIDE AND ALBUTEROL SULFATE 3 ML: .5; 3 SOLUTION RESPIRATORY (INHALATION) at 20:01

## 2018-01-01 RX ADMIN — DIVALPROEX SODIUM 125 MG: 125 CAPSULE, COATED PELLETS ORAL at 21:06

## 2018-01-01 RX ADMIN — LORAZEPAM 2 MG: 2 INJECTION INTRAMUSCULAR; INTRAVENOUS at 20:19

## 2018-01-01 RX ADMIN — MORPHINE SULFATE 4 MG: 4 INJECTION, SOLUTION INTRAMUSCULAR; INTRAVENOUS at 11:56

## 2018-01-01 RX ADMIN — DIVALPROEX SODIUM 125 MG: 125 CAPSULE, COATED PELLETS ORAL at 08:23

## 2018-01-01 RX ADMIN — WARFARIN SODIUM 7.5 MG: 7.5 TABLET ORAL at 17:09

## 2018-01-01 RX ADMIN — FUROSEMIDE 20 MG: 10 INJECTION, SOLUTION INTRAMUSCULAR; INTRAVENOUS at 17:14

## 2018-01-01 RX ADMIN — DONEPEZIL HYDROCHLORIDE 10 MG: 10 TABLET, FILM COATED ORAL at 20:44

## 2018-01-01 RX ADMIN — ATORVASTATIN CALCIUM 10 MG: 10 TABLET, FILM COATED ORAL at 21:32

## 2018-01-01 RX ADMIN — LORAZEPAM 0.5 MG: 2 INJECTION INTRAMUSCULAR; INTRAVENOUS at 10:57

## 2018-01-01 RX ADMIN — MORPHINE SULFATE 2 MG: 2 INJECTION, SOLUTION INTRAMUSCULAR; INTRAVENOUS at 16:32

## 2018-01-01 RX ADMIN — POTASSIUM CHLORIDE 40 MEQ: 1.5 POWDER, FOR SOLUTION ORAL at 09:49

## 2018-01-01 RX ADMIN — LORAZEPAM 1 MG: 2 INJECTION INTRAMUSCULAR; INTRAVENOUS at 09:07

## 2018-01-01 RX ADMIN — DONEPEZIL HYDROCHLORIDE 10 MG: 10 TABLET, FILM COATED ORAL at 21:06

## 2018-01-20 PROBLEM — I21.4 NON-STEMI (NON-ST ELEVATED MYOCARDIAL INFARCTION) (HCC): Status: ACTIVE | Noted: 2018-01-01

## 2018-01-20 PROBLEM — R77.8 ELEVATED TROPONIN: Status: ACTIVE | Noted: 2018-01-01

## 2018-01-20 NOTE — CONSULTS
Date of Hospital Visit: 18  Encounter Provider: Wolf Rocha MD  Place of Service: [facility] CARDIOLOGY  Patient Name: Angie Mckeon  :1929  Referral Provider: Alvin Calderon MD    Chief complaint: elevated troponin     History of Present Illness: Ms. Mckeon is an 88 year old woman who lives in a nursing home.  She has significant dementia and minimally conversant.  She has permanent AF (on warfarin), valvular heart disease s/p mechanical AVR and MVR, sleep apnea, and a prior brain meningioma s/p crainotomy/resection.      She had a cath in  which showed normal coronaries.  An echocardiogram in  showed an LVEF of 60-65% with normal prosthetic valvular function.      She was sent to the ED from her NH today with slurred speech and altered mental status, as well as a report of chest pain.  She cannot give me any history. Her eyes are open but she doesn't really acknowledge me at all. Her BP ranged from 130/900 to 160-120.  Her HR on her EKG was 40 bpm (AF), but it has generally been in the 50-70s here on the floor.  Her INR is 3, proBNP 2500, and Tn 0.14.    She is a DNR.      Previous testing:     Echo 2014:       Holter 2014:      Past Medical History:   Diagnosis Date   • Dementia    • History of brain tumor     X2   • Osteoporosis    • Permanent atrial fibrillation    • Sleep apnea    • Tremor    • Valvular heart disease     s/p mechanical AVR/MVR       Past Surgical History:   Procedure Laterality Date   • CARDIAC VALVE REPLACEMENT     • HYSTERECTOMY     • JOINT REPLACEMENT      SULTANA. HIP       Prior to Admission medications    Medication Sig Start Date End Date Taking? Authorizing Provider   cetirizine (zyrTEC) 10 MG tablet Take 10 mg by mouth Daily.   Yes Historical Provider, MD   Cholecalciferol (VITAMIN D3) 5000 units capsule capsule Take 5,000 Units by mouth Daily.   Yes Historical Provider, MD   Divalproex Sodium (DEPAKOTE SPRINKLE) 125 MG capsule Take  125 mg by mouth 2 (Two) Times a Day.   Yes Historical Provider, MD   donepezil (ARICEPT) 10 MG tablet Take 10 mg by mouth Every Night.   Yes Historical Provider, MD   furosemide (LASIX) 20 MG tablet Take 20 mg by mouth Daily.   Yes Historical Provider, MD   HYDROcodone-acetaminophen (NORCO) 5-325 MG per tablet Take 1 tablet by mouth 2 (Two) Times a Day.   Yes Historical Provider, MD   potassium chloride (KLOR-CON) 20 MEQ packet Take 20 mEq by mouth 2 (Two) Times a Day.   Yes Historical Provider, MD   risperiDONE (risperDAL) 0.25 MG tablet Take 0.25 mg by mouth Daily.   Yes Historical Provider, MD   warfarin (COUMADIN) 2.5 MG tablet Take 2.5 mg by mouth Daily.   Yes Historical Provider, MD       Social History     Social History   • Marital status: Single     Spouse name: N/A   • Number of children: N/A   • Years of education: N/A     Occupational History   • Not on file.     Social History Main Topics   • Smoking status: Unknown If Ever Smoked   • Smokeless tobacco: Not on file   • Alcohol use Defer   • Drug use: Defer   • Sexual activity: Not on file     Other Topics Concern   • Not on file     Social History Narrative   • No narrative on file       History reviewed. No pertinent family history.    Review of Systems   Unable to perform ROS: Dementia       Objective:     Vitals:    01/20/18 1212 01/20/18 1304 01/20/18 1333 01/20/18 1426   BP:  102/63 122/97 109/88   BP Location:    Left arm   Patient Position:    Lying   Pulse: (!) 47 69 76 68   Resp: 24   18   Temp:    98.5 °F (36.9 °C)   TempSrc:    Axillary   SpO2: 90% 97% 91% 96%     There is no height or weight on file to calculate BMI.      Physical Exam   Constitutional:   Obese, eyes open but no response to me at all    HENT:   Head: Normocephalic.   Nose: Nose normal.   Mouth/Throat: Mucous membranes are dry.   Eyes: Conjunctivae are normal.   Neck: Normal range of motion.   Cannot assess JVD due to body habitus   Cardiovascular: Normal rate, normal heart  sounds and intact distal pulses.  An irregularly irregular rhythm present.   Mechanical S1/2   Pulmonary/Chest: Effort normal and breath sounds normal.   Abdominal: Soft.   Cannot feel organs or aorta   Musculoskeletal: She exhibits edema (nonpitting edema, marked stasis change).   Skin: Skin is warm and dry. No erythema.   Psychiatric: She is noncommunicative.   Vitals reviewed.              Lab Review:                  Results from last 7 days  Lab Units 01/20/18  1036   SODIUM mmol/L 141   POTASSIUM mmol/L 4.4   CHLORIDE mmol/L 103   CO2 mmol/L 26.3   BUN mg/dL 10   CREATININE mg/dL 0.90   GLUCOSE mg/dL 90   CALCIUM mg/dL 9.6       Results from last 7 days  Lab Units 01/20/18  1036   TROPONIN T ng/mL 0.136*       Results from last 7 days  Lab Units 01/20/18  1036   WBC 10*3/mm3 4.10*   HEMOGLOBIN g/dL 13.2   HEMATOCRIT % 38.0   PLATELETS 10*3/mm3 143       Results from last 7 days  Lab Units 01/20/18  1036   INR  3.03*                      I personally viewed and interpreted the patient's EKG/Telemetry data    Assessment/Plan:     1.  Elevated Tn -- reported to have had CP at the NH.  No history available here.  Due to advanced age and advanced dementia, I recommend medical therapy only.  An echo has been ordered.  Continue aspirin and start oral nitrates.  A BB was ordered here but her rate is too low for this. Repeat a Tn in the AM.    2.  Permanent AF -- with widely labile rate, although she was documented to have a markedly slow rate in the ED.  I have discontinued the BB order.  Donepezil can lower HR as well.      3.  Dementia    4.  Her BNP was minimally elevated in the ED but her mucus membranes are dry and her CXR is clear.  She does not need serial BNPs; she is not in CHF.      Check VPA level in AM.

## 2018-01-20 NOTE — ED PROVIDER NOTES
EMERGENCY DEPARTMENT ENCOUNTER    CHIEF COMPLAINT  Chief Complaint: Altered mental status  History given by: Pt  History limited by: Dementia  Room Number: 35/35  PMD: Steve Hicks MD      HPI:  Pt is a 88 y.o. female who presents from the NH, c/o altered mental status. Pt also complains of L sided CP. Per NH, pt was found in the hallway yesterday evening and had decreased responsiveness. Per NH, pt was last seen normal last night. Pt denies SOB, vomiting, abd pain and diarrhea. Pt has hx of dementia, Afib, and a brain tumor.       Duration:  Yesterday evening  Onset: Gradual  Timing: Constant  Location: Chest  Radiation: None  Quality: Pain  Intensity/Severity: Moderate  Progression: Unchaned  Associated Symptoms: CP  Aggravating Factors: None  Alleviating Factors: None  Previous Episodes: None  Treatment before arrival: None    PAST MEDICAL HISTORY  Active Ambulatory Problems     Diagnosis Date Noted   • No Active Ambulatory Problems     Resolved Ambulatory Problems     Diagnosis Date Noted   • No Resolved Ambulatory Problems     Past Medical History:   Diagnosis Date   • A-fib    • Dementia    • History of brain tumor    • Osteoporosis        PAST SURGICAL HISTORY  Past Surgical History:   Procedure Laterality Date   • CARDIAC VALVE REPLACEMENT     • HYSTERECTOMY     • JOINT REPLACEMENT      SULTANA. HIP       FAMILY HISTORY  History reviewed. No pertinent family history.    SOCIAL HISTORY  Social History     Social History   • Marital status: Single     Spouse name: N/A   • Number of children: N/A   • Years of education: N/A     Occupational History   • Not on file.     Social History Main Topics   • Smoking status: Unknown If Ever Smoked   • Smokeless tobacco: Not on file   • Alcohol use Defer   • Drug use: Defer   • Sexual activity: Not on file     Other Topics Concern   • Not on file     Social History Narrative   • No narrative on file       ALLERGIES  Review of patient's allergies indicates no known  allergies.    REVIEW OF SYSTEMS  Review of Systems   Unable to perform ROS: Dementia   Cardiovascular: Positive for chest pain.       PHYSICAL EXAM  ED Triage Vitals   Temp Heart Rate Resp BP SpO2   01/20/18 0913 01/20/18 0913 01/20/18 0913 01/20/18 0913 01/20/18 0913   99.2 °F (37.3 °C) 62 18 130/92 95 %      Temp src Heart Rate Source Patient Position BP Location FiO2 (%)   01/20/18 0913 01/20/18 0914 01/20/18 0914 -- --   Tympanic Monitor Sitting         Physical Exam   Constitutional: She is well-developed, well-nourished, and in no distress. She appears distressed.   HENT:   Head: Normocephalic and atraumatic.   Eyes: EOM are normal. Pupils are equal, round, and reactive to light.   Neck: Normal range of motion. Neck supple.   Cardiovascular: Regular rhythm.  Bradycardia present.    Murmur (2/6 ejection) heard.  Pulmonary/Chest: She is in respiratory distress (mild). She has wheezes (mild expiratory ). She has rhonchi in the left lower field.   Abdominal: Soft. There is no tenderness. There is no rebound and no guarding.   Musculoskeletal: Normal range of motion. She exhibits no edema.   Weak BLE   Neurological: She is alert. She has normal sensation and normal strength.   Skin: Skin is warm and dry. No rash noted.   Psychiatric: Mood and affect normal.   Nursing note and vitals reviewed.      LAB RESULTS  Lab Results (last 24 hours)     Procedure Component Value Units Date/Time    CBC & Differential [[id number]] Collected:  01/20/18 1036    Specimen:  Blood Updated:  01/20/18 1052    Narrative:       The following orders were created for panel order CBC & Differential.  Procedure                               Abnormality         Status                     ---------                               -----------         ------                     CBC Auto Differential[[id number]]        Abnormal            Final result                 Please view results for these tests on the individual orders.    Comprehensive  Metabolic Panel [[id number]]  (Abnormal) Collected:  01/20/18 1036    Specimen:  Blood Updated:  01/20/18 1121     Glucose 90 mg/dL      BUN 10 mg/dL      Creatinine 0.90 mg/dL      Sodium 141 mmol/L      Potassium 4.4 mmol/L      Chloride 103 mmol/L      CO2 26.3 mmol/L      Calcium 9.6 mg/dL      Total Protein 7.5 g/dL      Albumin 3.90 g/dL      ALT (SGPT) 10 U/L      AST (SGOT) 25 U/L       Specimen hemolyzed.  Results may be affected.        Alkaline Phosphatase 62 U/L      Total Bilirubin 0.8 mg/dL      eGFR Non African Amer 59 (L) mL/min/1.73      Globulin 3.6 gm/dL      A/G Ratio 1.1 g/dL      BUN/Creatinine Ratio 11.1     Anion Gap 11.7 mmol/L     Narrative:       The MDRD GFR formula is only valid for adults with stable renal function between ages 18 and 70.    Protime-INR [[id number]]  (Abnormal) Collected:  01/20/18 1036    Specimen:  Blood Updated:  01/20/18 1109     Protime 30.5 (H) Seconds      INR 3.03 (H)    Troponin [[id number]]  (Abnormal) Collected:  01/20/18 1036    Specimen:  Blood Updated:  01/20/18 1123     Troponin T 0.136 (C) ng/mL     Narrative:       Troponin T Reference Ranges:  Less than 0.03 ng/mL:    Negative for AMI  0.03 to 0.09 ng/mL:      Indeterminant for AMI  Greater than 0.09 ng/mL: Positive for AMI    BNP [[id number]]  (Abnormal) Collected:  01/20/18 1036    Specimen:  Blood Updated:  01/20/18 1112     proBNP 2557.0 (H) pg/mL     Narrative:       Among patients with dyspnea, NT-proBNP is highly sensitive for the detection of acute congestive heart failure. In addition NT-proBNP of <300 pg/ml effectively rules out acute congestive heart failure with 99% negative predictive value.    Lactic Acid, Plasma [[id number]]  (Normal) Collected:  01/20/18 1036    Specimen:  Blood Updated:  01/20/18 1113     Lactate 0.9 mmol/L     Blood Culture - Blood, [[id number]] Collected:  01/20/18 1036    Specimen:  Blood from Arm, Left Updated:  01/20/18 1044    Valproic Acid Level, Total [[id number]]   (Abnormal) Collected:  01/20/18 1036    Specimen:  Blood Updated:  01/20/18 1113     Valproic Acid 33.0 (L) mcg/mL     CBC Auto Differential [[id number]]  (Abnormal) Collected:  01/20/18 1036    Specimen:  Blood Updated:  01/20/18 1052     WBC 4.10 (L) 10*3/mm3      RBC 4.12 10*6/mm3      Hemoglobin 13.2 g/dL      Hematocrit 38.0 %      MCV 92.2 fL      MCH 32.0 pg      MCHC 34.7 g/dL      RDW 13.1 (H) %      RDW-SD 44.2 fl      MPV 10.8 fL      Platelets 143 10*3/mm3      Neutrophil % 70.8 %      Lymphocyte % 20.2 %      Monocyte % 6.6 %      Eosinophil % 2.2 %      Basophil % 0.2 %      Immature Grans % 0.0 %      Neutrophils, Absolute 2.90 10*3/mm3      Lymphocytes, Absolute 0.83 (L) 10*3/mm3      Monocytes, Absolute 0.27 10*3/mm3      Eosinophils, Absolute 0.09 10*3/mm3      Basophils, Absolute 0.01 10*3/mm3      Immature Grans, Absolute 0.00 10*3/mm3     Urinalysis With / Culture If Indicated - Urine, Catheter [[id number]]  (Abnormal) Collected:  01/20/18 1131    Specimen:  Urine from Urine, Catheter Updated:  01/20/18 1223     Color, UA Yellow     Appearance, UA Cloudy (A)     pH, UA 7.5     Specific Gravity, UA 1.015     Glucose, UA Negative     Ketones, UA Negative     Bilirubin, UA Negative     Blood, UA Small (1+) (A)     Protein, UA Negative     Leuk Esterase, UA Small (1+) (A)     Nitrite, UA Negative     Urobilinogen, UA 1.0 E.U./dL    Urinalysis, Microscopic Only - Urine, Clean Catch [[id number]]  (Abnormal) Collected:  01/20/18 1131    Specimen:  Urine from Urine, Catheter Updated:  01/20/18 1223     RBC, UA 3-5 (A) /HPF      WBC, UA 3-5 (A) /HPF      Bacteria, UA 2+ (A) /HPF      Squamous Epithelial Cells, UA 7-12 (A) /HPF      Transitional Epithelial Cells, UA 0-2 /HPF      Hyaline Casts, UA None Seen /LPF      Amorphous Crystals, UA Moderate/2+ /HPF      Methodology Manual Light Microscopy    Blood Culture - Blood, [[id number]] Collected:  01/20/18 1138    Specimen:  Blood from Arm, Left Updated:   01/20/18 1149          I ordered the above labs and reviewed the results    RADIOLOGY  XR Chest 2 View   Final Result   Minimal likely atelectasis at the left base. Cardiomegaly.   Tortuous aorta.       This report was finalized on 1/20/2018 10:12 AM by Dr. Norman Purcell MD.          CT Head Without Contrast   Final Result           No acute intracranial hemorrhage or hydrocephalus. Chronic changes.   If there is further clinical concern, MRI could be considered for   further evaluation.       This report was finalized on 1/20/2018 10:10 AM by Dr. Norman Purcell MD.               I ordered the above noted radiological studies. Interpreted by radiologist.  Reviewed by me in PACS.       PROCEDURES  Critical Care  Performed by: NEAL CROUCH  Authorized by: NEAL CROUCH     Critical care provider statement:     Critical care time (minutes):  30    Critical care time was exclusive of:  Separately billable procedures and treating other patients    Critical care was necessary to treat or prevent imminent or life-threatening deterioration of the following conditions:  Cardiac failure    Critical care was time spent personally by me on the following activities:  Development of treatment plan with patient or surrogate, discussions with consultants, evaluation of patient's response to treatment, examination of patient, obtaining history from patient or surrogate, ordering and performing treatments and interventions, ordering and review of laboratory studies, ordering and review of radiographic studies, pulse oximetry, re-evaluation of patient's condition and review of old charts        EKG           EKG time: 1000  Rhythm/Rate: Afib/40  P waves and NH: Normal  QRS, axis: Q waves V1 and V2   ST and T waves: Nonspecific ST changes    Interpreted Contemporaneously by me, independently viewed  unchanged compared to prior 4/15/2014    PROGRESS AND CONSULTS  ED Course   0173  Labs, CT Head, XR Chest and EKG  ordered.    1154  Received call from Dr. Rocha(cardiologist) to discuss the pt's case. Dr. Rocha recommends conservative treatment and admission to medicine.    1220  Consult placed to [facility].    1223  Orders placed for aspirin.    1231  Discussed pt's case with Dr. Calderon ([facility]) who agrees to admit the pt.         MEDICAL DECISION MAKING  Results were reviewed/discussed with the patient and they were also made aware of online access. Pt also made aware that some labs, such as cultures, will not be resulted during ER visit and follow up with PMD is necessary.     MDM  Number of Diagnoses or Management Options  Non-STEMI (non-ST elevated myocardial infarction):      Amount and/or Complexity of Data Reviewed  Clinical lab tests: ordered and reviewed (TROP - 0.136, PROBNP - 2557.0)  Tests in the radiology section of CPT®: reviewed and ordered (XR Chest)  Tests in the medicine section of CPT®: reviewed and ordered (Review procedures for EKG results.)  Decide to obtain previous medical records or to obtain history from someone other than the patient: yes  Obtain history from someone other than the patient: yes (NH)  Review and summarize past medical records: yes (Pt was admitted Feb 2016 and found to have chronic Afib, dementia and pneumonia.)  Discuss the patient with other providers: yes (Dr. Rocha (cardiologist)  Dr. Calderon ([facility]))  Independent visualization of images, tracings, or specimens: yes           DIAGNOSIS  Final diagnoses:   Non-STEMI (non-ST elevated myocardial infarction)       DISPOSITION  ADMISSION    Discussed treatment plan and reason for admission with pt/family and admitting physician.  Pt/family voiced understanding of the plan for admission for further testing/treatment as needed.       Latest Documented Vital Signs:  As of 12:39 PM  BP- (!) 163/120 HR- (!) 47 Temp- 99.2 °F (37.3 °C) (Tympanic) O2 sat- 90%    --  Documentation assistance provided by roly Stanley and Surya Marina for   Emily.  Information recorded by the scribe was done at my direction and has been verified and validated by me.        Surya Marina  01/20/18 1238       Nayana Stanley  01/20/18 1239       Juma Diaz MD  01/20/18 9883

## 2018-01-20 NOTE — H&P
History and physical    Primary care physician  Dr. Hicks    Chief complaint  Altered mental status  Chest pain  Shortness of breath    History of present illness  88-year-old white female with history of chronic atrial fibrillation on anticoagulation other medical problem dementia brain tumor osteoarthritis osteoporosis who is a nursing home resident sent to the [facility], [place], operated by [facility] emergency room with mental status changes.  Patient is very confused top minimally complain of some chest pain shortness of breath.  Patient workup in ER revealed elevated troponin level consistent with non-ST elevation MI and UTI admitted for management.  No family member available.  Most of the history obtained from the chart and staff will record.  Per nursing staff no fever chills no cough congestion or nausea vomiting diarrhea.    PAST MEDICAL HISTORY    • A-fib     • Dementia     • History of brain tumor     • Osteoporosis        PAST SURGICAL HISTORY   Surgical History          Past Surgical History:   Procedure Laterality Date   • CARDIAC VALVE REPLACEMENT       • HYSTERECTOMY       • JOINT REPLACEMENT         SULTANA. HIP         FAMILY HISTORY  History reviewed. No pertinent family history.     SOCIAL HISTORY   Social History    Social History            Social History   • Marital status: Single       Spouse name: N/A   • Number of children: N/A   • Years of education: N/A          Occupational History   • Not on file.           Social History Main Topics   • Smoking status: Unknown If Ever Smoked   • Smokeless tobacco: Not on file   • Alcohol use Defer   • Drug use: Defer   • Sexual activity: Not on file           Other Topics Concern   • Not on file          Social History Narrative   • No narrative on file            ALLERGIES  Review of patient's allergies indicates no known allergies.    Nursing home medications reviewed     REVIEW OF SYSTEMS  Review of Systems   Unable to perform ROS: Dementia   Cardiovascular: Positive for chest  pain.      PHYSICAL EXAM  Blood pressure 109/88, pulse 68, temperature 98.5 °F (36.9 °C), temperature source Axillary, resp. rate 18, SpO2 96 %.    Constitutional: She is well-developed, well-nourished, and in no distress. She appears distressed.   Head: Normocephalic and atraumatic.   Eyes: EOM are normal. Pupils are equal, round, and reactive to light.   Neck: Normal range of motion. Neck supple.   Cardiovascular: Regular rhythm.  Bradycardia present.    Murmur (2/6 ejection) heard.  Pulmonary/Chest: She is in respiratory distress (mild). She has wheezes (mild expiratory ). She has rhonchi in the left lower field.   Abdominal: Soft. There is no tenderness. There is no rebound and no guarding.   Musculoskeletal: Normal range of motion. She exhibits no edema.   Neurological: She is alert. She has normal sensation and normal strength.   Skin: Skin is warm and dry. No rash noted.   Psychiatric: Mood and affect normal.     LAB RESULTS  Lab Results (last 24 hours)     Procedure Component Value Units Date/Time    Blood Culture - Blood, [[id number]] Collected:  01/20/18 1036    Specimen:  Blood from Arm, Left Updated:  01/20/18 1044    CBC & Differential [[id number]] Collected:  01/20/18 1036    Specimen:  Blood Updated:  01/20/18 1052    Narrative:       The following orders were created for panel order CBC & Differential.  Procedure                               Abnormality         Status                     ---------                               -----------         ------                     CBC Auto Differential[[id number]]        Abnormal            Final result                 Please view results for these tests on the individual orders.    CBC Auto Differential [[id number]]  (Abnormal) Collected:  01/20/18 1036    Specimen:  Blood Updated:  01/20/18 1052     WBC 4.10 (L) 10*3/mm3      RBC 4.12 10*6/mm3      Hemoglobin 13.2 g/dL      Hematocrit 38.0 %      MCV 92.2 fL      MCH 32.0 pg      MCHC 34.7 g/dL      RDW 13.1  (H) %      RDW-SD 44.2 fl      MPV 10.8 fL      Platelets 143 10*3/mm3      Neutrophil % 70.8 %      Lymphocyte % 20.2 %      Monocyte % 6.6 %      Eosinophil % 2.2 %      Basophil % 0.2 %      Immature Grans % 0.0 %      Neutrophils, Absolute 2.90 10*3/mm3      Lymphocytes, Absolute 0.83 (L) 10*3/mm3      Monocytes, Absolute 0.27 10*3/mm3      Eosinophils, Absolute 0.09 10*3/mm3      Basophils, Absolute 0.01 10*3/mm3      Immature Grans, Absolute 0.00 10*3/mm3     Protime-INR [[id number]]  (Abnormal) Collected:  01/20/18 1036    Specimen:  Blood Updated:  01/20/18 1109     Protime 30.5 (H) Seconds      INR 3.03 (H)    BNP [[id number]]  (Abnormal) Collected:  01/20/18 1036    Specimen:  Blood Updated:  01/20/18 1112     proBNP 2557.0 (H) pg/mL     Narrative:       Among patients with dyspnea, NT-proBNP is highly sensitive for the detection of acute congestive heart failure. In addition NT-proBNP of <300 pg/ml effectively rules out acute congestive heart failure with 99% negative predictive value.    Lactic Acid, Plasma [[id number]]  (Normal) Collected:  01/20/18 1036    Specimen:  Blood Updated:  01/20/18 1113     Lactate 0.9 mmol/L     Valproic Acid Level, Total [[id number]]  (Abnormal) Collected:  01/20/18 1036    Specimen:  Blood Updated:  01/20/18 1113     Valproic Acid 33.0 (L) mcg/mL     Comprehensive Metabolic Panel [[id number]]  (Abnormal) Collected:  01/20/18 1036    Specimen:  Blood Updated:  01/20/18 1121     Glucose 90 mg/dL      BUN 10 mg/dL      Creatinine 0.90 mg/dL      Sodium 141 mmol/L      Potassium 4.4 mmol/L      Chloride 103 mmol/L      CO2 26.3 mmol/L      Calcium 9.6 mg/dL      Total Protein 7.5 g/dL      Albumin 3.90 g/dL      ALT (SGPT) 10 U/L      AST (SGOT) 25 U/L       Specimen hemolyzed.  Results may be affected.        Alkaline Phosphatase 62 U/L      Total Bilirubin 0.8 mg/dL      eGFR Non African Amer 59 (L) mL/min/1.73      Globulin 3.6 gm/dL      A/G Ratio 1.1 g/dL      BUN/Creatinine  Ratio 11.1     Anion Gap 11.7 mmol/L     Narrative:       The MDRD GFR formula is only valid for adults with stable renal function between ages 18 and 70.    Troponin [[id number]]  (Abnormal) Collected:  01/20/18 1036    Specimen:  Blood Updated:  01/20/18 1123     Troponin T 0.136 (C) ng/mL     Narrative:       Troponin T Reference Ranges:  Less than 0.03 ng/mL:    Negative for AMI  0.03 to 0.09 ng/mL:      Indeterminant for AMI  Greater than 0.09 ng/mL: Positive for AMI    Blood Culture - Blood, [[id number]] Collected:  01/20/18 1138    Specimen:  Blood from Arm, Left Updated:  01/20/18 1149    Urinalysis With / Culture If Indicated - Urine, Catheter [[id number]]  (Abnormal) Collected:  01/20/18 1131    Specimen:  Urine from Urine, Catheter Updated:  01/20/18 1223     Color, UA Yellow     Appearance, UA Cloudy (A)     pH, UA 7.5     Specific Gravity, UA 1.015     Glucose, UA Negative     Ketones, UA Negative     Bilirubin, UA Negative     Blood, UA Small (1+) (A)     Protein, UA Negative     Leuk Esterase, UA Small (1+) (A)     Nitrite, UA Negative     Urobilinogen, UA 1.0 E.U./dL    Urinalysis, Microscopic Only - Urine, Clean Catch [[id number]]  (Abnormal) Collected:  01/20/18 1131    Specimen:  Urine from Urine, Catheter Updated:  01/20/18 1223     RBC, UA 3-5 (A) /HPF      WBC, UA 3-5 (A) /HPF      Bacteria, UA 2+ (A) /HPF      Squamous Epithelial Cells, UA 7-12 (A) /HPF      Transitional Epithelial Cells, UA 0-2 /HPF      Hyaline Casts, UA None Seen /LPF      Amorphous Crystals, UA Moderate/2+ /HPF      Methodology Manual Light Microscopy        Imaging Results (last 24 hours)     Procedure Component Value Units Date/Time    CT Head Without Contrast [[id number]] Collected:  01/20/18 1004     Updated:  01/20/18 1013    Narrative:       CT HEAD WO CONTRAST-     INDICATIONS: Decreased alertness     TECHNIQUE: Radiation dose reduction techniques were utilized, including  automated exposure control and exposure  modulation based on body size.      Noncontrast head CT     COMPARISON: 2/19/2016     FINDINGS:      Frontal and occipital craniotomy changes are redemonstrated, calcified  dural based mass posterior midline posterior fossa appears stable     No acute intracranial hemorrhage, midline shift or mass effect. No acute  territorial infarct is identified.     Mild periventricular hypodensities suggest chronic small vessel ischemic  change in a patient this age.           Ventricles, cisterns, cerebral sulci appear stable.     The visualized paranasal sinuses, orbits, mastoid air cells are  unremarkable.                   Impression:              No acute intracranial hemorrhage or hydrocephalus. Chronic changes.  If there is further clinical concern, MRI could be considered for  further evaluation.     This report was finalized on 1/20/2018 10:10 AM by Dr. Norman Purcell MD.       XR Chest 2 View [[id number]] Collected:  01/20/18 1010     Updated:  01/20/18 1015    Narrative:       XR CHEST 2 VW-     HISTORY: Female who is 88 years-old,  weakness     TECHNIQUE: Frontal and lateral views of the chest     COMPARISON: 2/19/2016, 02/24/2016     FINDINGS: Heart is enlarged. Aorta is calcified, tortuous. Pulmonary  vasculature is unremarkable. Minimal likely atelectasis at the left  base. No pleural effusion, or pneumothorax. No acute osseous process.       Impression:       Minimal likely atelectasis at the left base. Cardiomegaly.  Tortuous aorta.     This report was finalized on 1/20/2018 10:12 AM by Dr. Norman Purcell MD.           EKG                                                  Rhythm/Rate: Afib/40  P waves and OH: Normal  QRS, axis: Q waves V1 and V2   ST and T waves: Nonspecific ST changes      Current Facility-Administered Medications:   •  aspirin suppository 300 mg, 300 mg, Rectal, Daily, Alvin Calderon MD  •  atorvastatin (LIPITOR) tablet 10 mg, 10 mg, Oral, Nightly, Alvin Calderon MD  •  cefTRIAXone  (ROCEPHIN) IVPB 1 g, 1 g, Intravenous, Q24H, Mayur Calderon MD  •  Divalproex Sodium (DEPAKOTE SPRINKLE) capsule 125 mg, 125 mg, Oral, Q12H, Mayur Calderon MD  •  donepezil (ARICEPT) tablet 10 mg, 10 mg, Oral, Nightly, Mayur Calderon MD  •  furosemide (LASIX) tablet 40 mg, 40 mg, Oral, BID, Mayur Calderon MD  •  ipratropium-albuterol (DUO-NEB) nebulizer solution 3 mL, 3 mL, Nebulization, Q4H - RT, Mayur Calderon MD  •  nitroglycerin (NITROSTAT) ointment 0.5 inch, 0.5 inch, Topical, Q8H, Mayur Calderon MD  •  risperiDONE (risperDAL) tablet 0.25 mg, 0.25 mg, Oral, Daily, Mayur Calderon MD  •  Insert peripheral IV, , , Once **AND** sodium chloride 0.9 % flush 10 mL, 10 mL, Intravenous, PRN, Juma Diaz MD     ASSESSMENT  Acute UTI  Chest pain with elevated troponin consistent with non-ST elevation MI  Dementia  Chronic atrial fibrillation 90 coagulation with therapeutic INR  Acute  on chronic diastolic CHF  Hyperlipidemia  Gastroesophageal reflux disease  DNR    PLAN  Admit  Supplement oxygen nitroglycerin aspirin beta blocker  Hold Coumadin  Serial enzymes and EKG  Diuresis with strict I's and O's and daily weight  Check 2-D echo  Cardiac consult  Empiric antibiotics  Continue nursing home medications except Coumadin  Stress ulcer prophylaxis  DNR  Follow closely further recommendation according to hospital course    MAYUR CALDERON MD

## 2018-01-21 NOTE — PROGRESS NOTES
"Daily progress note    Chief complaint  Doing little better this morning  More alert  Denies any chest pain    History of present illness  88-year-old white female with history of chronic atrial fibrillation on anticoagulation other medical problem dementia brain tumor osteoarthritis osteoporosis who is a nursing home resident sent to the [facility] emergency room with mental status changes.  Patient is very confused top minimally complain of some chest pain shortness of breath.  Patient workup in ER revealed elevated troponin level consistent with non-ST elevation MI and UTI admitted for management.  No family member available.  Most of the history obtained from the chart and staff will record.  Per nursing staff no fever chills no cough congestion or nausea vomiting diarrhea.     REVIEW OF SYSTEMS  Review of Systems   Unable to perform ROS: Dementia   Cardiovascular: Positive for chest pain.      PHYSICAL EXAM  Blood pressure 105/56, pulse 51, temperature 97.6 °F (36.4 °C), temperature source Oral, resp. rate 18, height 157.5 cm (62.01\"), weight 79.4 kg (175 lb), SpO2 98 %.    Constitutional: She is well-developed, well-nourished, and in no distress. She appears distressed.   Head: Normocephalic and atraumatic.   Eyes: EOM are normal. Pupils are equal, round, and reactive to light.   Neck: Normal range of motion. Neck supple.   Cardiovascular: Regular rhythm.  Bradycardia present.    Murmur (2/6 ejection) heard.  Pulmonary/Chest: She is in respiratory distress (mild). She has wheezes (mild expiratory ). She has rhonchi in the left lower field.   Abdominal: Soft. There is no tenderness. There is no rebound and no guarding.   Musculoskeletal: Normal range of motion. She exhibits no edema.   Neurological: She is alert. She has normal sensation and normal strength.   Skin: Skin is warm and dry. No rash noted.   Psychiatric: Mood and affect normal.     LAB RESULTS  Lab Results (last 24 hours)     Procedure " Component Value Units Date/Time    CK-MB [[id number]]  (Abnormal) Collected:  01/20/18 1036    Specimen:  Blood Updated:  01/20/18 1620     CKMB 6.17 (H) ng/mL     CK [[id number]]  (Normal) Collected:  01/20/18 1036    Specimen:  Blood Updated:  01/20/18 1622     Creatine Kinase 91 U/L     Valproic Acid Level, Free [[id number]] Collected:  01/21/18 0450    Specimen:  Blood Updated:  01/21/18 0518    CBC & Differential [[id number]] Collected:  01/21/18 0450    Specimen:  Blood Updated:  01/21/18 0532    Narrative:       The following orders were created for panel order CBC & Differential.  Procedure                               Abnormality         Status                     ---------                               -----------         ------                     CBC Auto Differential[[id number]]        Abnormal            Final result                 Please view results for these tests on the individual orders.    CBC Auto Differential [[id number]]  (Abnormal) Collected:  01/21/18 0450    Specimen:  Blood Updated:  01/21/18 0532     WBC 4.36 (L) 10*3/mm3      RBC 4.05 10*6/mm3      Hemoglobin 12.5 g/dL      Hematocrit 38.8 %      MCV 95.8 fL      MCH 30.9 pg      MCHC 32.2 (L) g/dL      RDW 12.7 %      RDW-SD 44.5 fl      MPV 10.1 fL      Platelets 109 (L) 10*3/mm3      Neutrophil % 72.7 %      Lymphocyte % 17.4 (L) %      Monocyte % 8.3 %      Eosinophil % 0.9 %      Basophil % 0.2 %      Immature Grans % 0.5 %      Neutrophils, Absolute 3.17 10*3/mm3      Lymphocytes, Absolute 0.76 (L) 10*3/mm3      Monocytes, Absolute 0.36 10*3/mm3      Eosinophils, Absolute 0.04 10*3/mm3      Basophils, Absolute 0.01 10*3/mm3      Immature Grans, Absolute 0.02 10*3/mm3     Hemoglobin A1c [[id number]]  (Abnormal) Collected:  01/21/18 0450    Specimen:  Blood Updated:  01/21/18 0535     Hemoglobin A1C 4.60 (L) %     Narrative:       Hemoglobin A1C Ranges:    Increased Risk for Diabetes  5.7% to 6.4%  Diabetes                     >=  6.5%  Diabetic Goal                < 7.0%    Protime-INR [[id number]]  (Abnormal) Collected:  01/21/18 0450    Specimen:  Blood Updated:  01/21/18 0544     Protime 26.9 (H) Seconds      INR 2.58 (H)    Lipid Panel [[id number]]  (Abnormal) Collected:  01/21/18 0450    Specimen:  Blood Updated:  01/21/18 0548     Total Cholesterol 184 mg/dL      Triglycerides 98 mg/dL      HDL Cholesterol 44 mg/dL      LDL Cholesterol  120 (H) mg/dL      VLDL Cholesterol 19.6 mg/dL      LDL/HDL Ratio 2.74    Narrative:       Cholesterol Reference Ranges  (U.S. Department of Health and Human Services ATP III Classifications)    Desirable          <200 mg/dL  Borderline High    200-239 mg/dL  High Risk          >240 mg/dL      Triglyceride Reference Ranges  (U.S. Department of Health and Human Services ATP III Classifications)    Normal           <150 mg/dL  Borderline High  150-199 mg/dL  High             200-499 mg/dL  Very High        >500 mg/dL    HDL Reference Ranges  (U.S. Department of Health and Human Services ATP III Classifcations)    Low     <40 mg/dl (major risk factor for CHD)  High    >60 mg/dl ('negative' risk factor for CHD)        LDL Reference Ranges  (U.S. Department of Health and Human Services ATP III Classifcations)    Optimal          <100 mg/dL  Near Optimal     100-129 mg/dL  Borderline High  130-159 mg/dL  High             160-189 mg/dL  Very High        >189 mg/dL    Basic Metabolic Panel [[id number]] Collected:  01/21/18 0450    Specimen:  Blood Updated:  01/21/18 0548     Glucose 96 mg/dL      BUN 9 mg/dL      Creatinine 0.78 mg/dL      Sodium 143 mmol/L      Potassium 3.5 mmol/L      Chloride 104 mmol/L      CO2 26.1 mmol/L      Calcium 9.0 mg/dL      eGFR Non African Amer 70 mL/min/1.73      BUN/Creatinine Ratio 11.5     Anion Gap 12.9 mmol/L     Narrative:       The MDRD GFR formula is only valid for adults with stable renal function between ages 18 and 70.    TSH [[id number]]  (Normal) Collected:  01/21/18  0450    Specimen:  Blood Updated:  01/21/18 0600     TSH 1.690 mIU/mL     Troponin [[id number]]  (Abnormal) Collected:  01/21/18 0450    Specimen:  Blood Updated:  01/21/18 0618     Troponin T 0.339 (C) ng/mL     Narrative:       Troponin T Reference Ranges:  Less than 0.03 ng/mL:    Negative for AMI  0.03 to 0.09 ng/mL:      Indeterminant for AMI  Greater than 0.09 ng/mL: Positive for AMI    Blood Culture - Blood, [[id number]]  (Normal) Collected:  01/20/18 1036    Specimen:  Blood from Arm, Left Updated:  01/21/18 1046     Blood Culture No growth at 24 hours    Blood Culture - Blood, [[id number]]  (Normal) Collected:  01/20/18 1138    Specimen:  Blood from Arm, Left Updated:  01/21/18 1201     Blood Culture No growth at 24 hours        Imaging Results (last 24 hours)     ** No results found for the last 24 hours. **        EKG                                                  Rhythm/Rate: Afib/40  P waves and MS: Normal  QRS, axis: Q waves V1 and V2   ST and T waves: Nonspecific ST changes      Current Facility-Administered Medications:   •  aspirin suppository 300 mg, 300 mg, Rectal, Daily, Alvin Calderon MD, 300 mg at 01/21/18 0845  •  atorvastatin (LIPITOR) tablet 10 mg, 10 mg, Oral, Nightly, Alvin Calderon MD  •  cefTRIAXone (ROCEPHIN) IVPB 1 g, 1 g, Intravenous, Q24H, Alvin Calderon MD, Last Rate: 100 mL/hr at 01/20/18 1749, 1 g at 01/20/18 1749  •  Divalproex Sodium (DEPAKOTE SPRINKLE) capsule 125 mg, 125 mg, Oral, Q12H, Alvin Calderon MD  •  donepezil (ARICEPT) tablet 10 mg, 10 mg, Oral, Nightly, Alvin Calderon MD  •  furosemide (LASIX) injection 40 mg, 40 mg, Intravenous, Q12H, Alvin Calderon MD, 40 mg at 01/21/18 0422  •  ipratropium-albuterol (DUO-NEB) nebulizer solution 3 mL, 3 mL, Nebulization, Q4H - RT, Alvin Calderon MD, 3 mL at 01/21/18 1402  •  isosorbide dinitrate (ISORDIL) tablet 10 mg, 10 mg, Oral, Q8H, Wolf Rocha MD  •  pantoprazole (PROTONIX) EC tablet 40 mg, 40 mg, Oral, Q AM, Alvin Calderon MD  •  potassium  chloride (MICRO-K) CR capsule 40 mEq, 40 mEq, Oral, Once, Wolf Rocha MD  •  risperiDONE (risperDAL) tablet 0.25 mg, 0.25 mg, Oral, Daily, Mayur Calderon MD  •  Insert peripheral IV, , , Once **AND** sodium chloride 0.9 % flush 10 mL, 10 mL, Intravenous, PRN, Juma Diaz MD     ASSESSMENT  Acute UTI  Chest pain with elevated troponin consistent with non-ST elevation MI  Dementia  Chronic atrial fibrillation 90 coagulation with therapeutic INR  Acute  on chronic diastolic CHF  Hyperlipidemia  Gastroesophageal reflux disease  DNR    PLAN  CPM  Supplement oxygen nitroglycerin aspirin beta blocker  AC  Diuresis   Cardiac consult  Empiric antibiotics  Continue nursing home medications   Stress ulcer prophylaxis  DNR  Follow closely further recommendation according to hospital course    MAYUR CALDERON MD

## 2018-01-21 NOTE — THERAPY EVALUATION
Acute Care - Speech Language Pathology   Swallow Initial Evaluation [facility]     Patient Name: Angie Mckeon  : 1929  MRN: [id number]  Today's Date: 2018               Admit Date: 2018    SPEECH-LANGUAGE PATHOLOGY EVALUATION - SWALLOW  Subjective: The patient was seen on this date for a Clinical Swallow evaluation.  Patient was lethargic but participated in eval. Family present during eval. Educated family on dementia care re: agitation/confusion.  Significant history: pt from a memory care unit; admit w/ decreased LOC; NSTEMI; dementia; tremor; CA; GERD.   Objective: Textures given included ice, thin liquid, nectar thick liquid and puree consistency.  Assessment: Difficulties were noted with thin liquid. Pt w/ strong cough at end of evaluation w/ thin water. Tolerated [place] via straw w/o difficulty; did need verbal cue for small sips.  Laryngeal rise slow. Pt too lethargic to attempt soft or solid foods at this time. Tolerate pureed w/ minor lingual residue. Did better w/ verbal cues/slow feed rate to swallow.     SLP Findings:  Patient presents with mild to moderate oropharyngeal dysphagia, without esophageal component.   Recommendations: Diet Textures: nectar thick liquid, puree consistency food.  Medications should be taken crushed with puree. May have water and ice between meals after oral care, under staff or family supervision and with the recommended strategies for safe swallowing.   Recommended Strategies: Upright for PO, small bites and sips, may use straw and supervision with all PO; Feed assist: go slow during feed and verbally cue to swallow; allow straws but verbally cue for small sips; Feed ONLY when pt alert and actively participates  Oral care before breakfast, after all meals and PRN.  Other Recommended Evaluations: Re-evaluation at bedside    Dysphagia therapy is recommended. Rationale: reeval swallow when strength improves.      Visit Dx:     ICD-10-CM ICD-9-CM   1.  Non-STEMI (non-ST elevated myocardial infarction) I21.4 410.70     Patient Active Problem List   Diagnosis   • Permanent atrial fibrillation   • Dementia   • Elevated troponin     Past Medical History:   Diagnosis Date   • Dementia    • History of brain tumor     X2   • Osteoporosis    • Permanent atrial fibrillation    • Sleep apnea    • Stroke    • Tremor    • Valvular heart disease     s/p mechanical AVR/MVR     Past Surgical History:   Procedure Laterality Date   • CARDIAC VALVE REPLACEMENT     • HYSTERECTOMY     • JOINT REPLACEMENT      SULTANA. HIP          SWALLOW EVALUATION (last 72 hours)      Swallow Evaluation       01/21/18 1600                Rehab Evaluation    Document Type evaluation  -SA        Subjective Information no complaints;weakness;fatigue  -SA        Patient Effort, Rehab Treatment fair  -SA        Symptoms Noted During/After Treatment fatigue  -SA        General Information    Patient Profile Review yes  -SA        Subjective Patient Observations Unintelligible at times; confused  -SA        Pertinent History Of Current Problem Admit w/ Decreased LOC; NH resident from memory care unit; pt w/ dementia; GERD; tremor; hx: CA  -SA        Current Diet Limitations NPO  -SA        Precautions/Limitations, Vision WFL  -SA        Precautions/Limitations, Hearing WFL  -SA        Prior Level of Function- Communication functional in a familiar environment/with familiar caregiver  -SA        Prior Level of Function- Swallowing no diet consistency restrictions;other (comment)   per family  -SA        Plans/Goals Discussed With patient and family  -SA        Barriers to Rehab cognitive status  -SA        Clinical Impression    Patient's Goals For Discharge return to PO diet   wants a drink  -SA        Family Goals For Discharge family did not state  -SA        SLP Swallowing Diagnosis mild dysphagia  -SA        Rehab Potential/Prognosis, Swallowing fair, will monitor progress closely  -SA        Criteria for  Skilled Therapeutic Interventions Met skilled criteria for dysphagia intervention met  -SA        FCM, Swallowing 3-->Level 3  -SA        Therapy Frequency PRN  -SA        Predicted Duration Therapy Interv (days) until discharge  -SA        Expected Duration Therapy Session (min) 15-30 minutes  -SA        SLP Diet Recommendation II - pureed;nectar/syrup-thick liquids  -SA        Recommended Diagnostics reassess via clinical swallow (non-instrumental exam)  -SA        Recommended Feeding/Eating Techniques maintain upright posture during/after eating for 30 mins;small sips/bites;other (see comments)   feed assist; go slow during feeding  -SA        SLP Rec. for Method of Medication Administration meds crushed in pudding/applesauce  -SA        Monitor For Signs Of Aspiration cough;elevated WBC count;gurgly voice;throat clearing;fever;upper respiratory infection;pneumonia;right lower lobe infiltrates  -SA        Anticipated Discharge Disposition extended care facility  -SA        Cognitive Assessment/Intervention    Current Cognitive/Communication Assessment impaired  -SA        Orientation Status oriented to;person  -SA        Follows Commands/Answers Questions 50% of the time;75% of the time;able to follow single-step instructions;needs cueing;needs increased time;needs repetition  -SA        Oral Motor Structure and Function    Oral Motor Anatomy and Physiology patient demonstrates anatomy and physiology that is WNL  -SA        Secretion Management WNL/WFL  -SA        Mucosal Quality dry;sticky  -SA        Volitional Swallow mild to moderate difficulties initiating volitional swallow  -SA        Volitional Cough absent volitional cough  -SA          User Key  (r) = Recorded By, (t) = Taken By, (c) = Cosigned By    Initials Name Effective Dates    SA Sosa Madden MS CCC-SLP 07/25/17 -         EDUCATION  The patient has been educated in the following areas:   Dysphagia (Swallowing Impairment) Oral Care/Hydration  Modified Diet Instruction.    SLP Recommendation and Plan  SLP Swallowing Diagnosis: mild dysphagia  SLP Diet Recommendation: II - pureed, nectar/syrup-thick liquids  Recommended Feeding/Eating Techniques: maintain upright posture during/after eating for 30 mins, small sips/bites, other (see comments) (feed assist; go slow during feeding)  SLP Rec. for Method of Medication Administration: meds crushed in pudding/applesauce  Monitor For Signs Of Aspiration: cough, elevated WBC count, gurgly voice, throat clearing, fever, upper respiratory infection, pneumonia, right lower lobe infiltrates  Recommended Diagnostics: reassess via clinical swallow (non-instrumental exam)  Criteria for Skilled Therapeutic Interventions Met: skilled criteria for dysphagia intervention met  Anticipated Discharge Disposition: extended care facility  Rehab Potential/Prognosis, Swallowing: fair, will monitor progress closely  Therapy Frequency: PRN                        IP SLP Goals       01/21/18 1615          Safely Consume Diet    Safely Consume Diet- SLP, Date Established 01/21/18  -      Safely Consume Diet- SLP, Time to Achieve by discharge  -        User Key  (r) = Recorded By, (t) = Taken By, (c) = Cosigned By    Initials Name Provider Type    SA Sosa Madden MS CCC-SLP Speech and Language Pathologist             SLP Outcome Measures (last 72 hours)      SLP Outcome Measures       01/21/18 1600          SLP Outcome Measures    Outcome Measure Used? Adult NOMS  -      FCM Scores    FCM Chosen Swallowing  -      Swallowing FCM Score 3  -        User Key  (r) = Recorded By, (t) = Taken By, (c) = Cosigned By    Initials Name Effective Dates    SA Sosa Madden MS CCC-SLP 07/25/17 -            Time Calculation:         Time Calculation- SLP       01/21/18 1618          Time Calculation- SLP    SLP Start Time 1515  -      SLP Stop Time 1600  -      SLP Time Calculation (min) 45 min  -      SLP Received On 01/21/18  -         User Key  (r) = Recorded By, (t) = Taken By, (c) = Cosigned By    Initials Name Provider Type    SA Sosa Madden MS CCC-SLP Speech and Language Pathologist          Therapy Charges for Today     Code Description Service Date Service Provider Modifiers Qty    [id number]  ST EVAL ORAL PHARYNG SWALLOW 3 1/21/2018 Sosa Madden MS CCC-SLP GN 1               Sosa Madden MS CCC-SLP  1/21/2018

## 2018-01-21 NOTE — PLAN OF CARE
Problem: Patient Care Overview (Adult)  Goal: Plan of Care Review  Outcome: Ongoing (interventions implemented as appropriate)   01/21/18 9189   Coping/Psychosocial Response Interventions   Plan Of Care Reviewed With patient;family   Outcome Evaluation   Outcome Summary/Follow up Plan [place] x2 at times. Speech garbled at times. Follows commands. Free of injury. Heels elevated off bed.     Goal: Adult Individualization and Mutuality  Outcome: Ongoing (interventions implemented as appropriate)    Goal: Discharge Needs Assessment  Outcome: Ongoing (interventions implemented as appropriate)      Problem: Acute Coronary Syndrome (ACS) (Adult)  Goal: Signs and Symptoms of Listed Potential Problems Will be Absent or Manageable (Acute Coronary Syndrome)  Outcome: Ongoing (interventions implemented as appropriate)      Problem: Pressure Ulcer Risk (Joe Scale) (Adult,Obstetrics,Pediatric)  Goal: Identify Related Risk Factors and Signs and Symptoms  Outcome: Ongoing (interventions implemented as appropriate)

## 2018-01-21 NOTE — PLAN OF CARE
Problem: Inpatient SLP  Goal: Dysphagia- Patient will safely consume diet as per recommendation with no signs/symptoms of aspiration   01/21/18 1615   Safely Consume Diet   Safely Consume Diet- SLP, Date Established 01/21/18   Safely Consume Diet- SLP, Time to Achieve by discharge

## 2018-01-21 NOTE — PLAN OF CARE
Problem: Patient Care Overview (Adult)  Goal: Plan of Care Review  Outcome: Ongoing (interventions implemented as appropriate)    Goal: Adult Individualization and Mutuality  Outcome: Ongoing (interventions implemented as appropriate)    Goal: Discharge Needs Assessment  Outcome: Ongoing (interventions implemented as appropriate)      Problem: Acute Coronary Syndrome (ACS) (Adult)  Goal: Signs and Symptoms of Listed Potential Problems Will be Absent or Manageable (Acute Coronary Syndrome)  Outcome: Ongoing (interventions implemented as appropriate)      Problem: Pressure Ulcer Risk (Joe Scale) (Adult,Obstetrics,Pediatric)  Goal: Identify Related Risk Factors and Signs and Symptoms  Outcome: Ongoing (interventions implemented as appropriate)    Goal: Skin Integrity  Outcome: Ongoing (interventions implemented as appropriate)      Problem: Fall Risk (Adult)  Goal: Identify Related Risk Factors and Signs and Symptoms  Outcome: Ongoing (interventions implemented as appropriate)    Goal: Absence of Falls  Outcome: Ongoing (interventions implemented as appropriate)

## 2018-01-21 NOTE — PROGRESS NOTES
LOS: 1 day   Patient Care Team:  Steve Hicks MD as PCP - General  Steve Hicks MD as PCP - Family Medicine    Chief Complaint: altered mental status       Interval History:  No change.  She is minimally responsive.  She isn't taking any oral medications.      Objective   Vital Signs  Temp:  [97.5 °F (36.4 °C)-98.5 °F (36.9 °C)] 97.6 °F (36.4 °C)  Heart Rate:  [46-76] 54  Resp:  [18-20] 18  BP: ()/(56-97) 105/56    Intake/Output Summary (Last 24 hours) at 01/21/18 1219  Last data filed at 01/21/18 0500   Gross per 24 hour   Intake                0 ml   Output              300 ml   Net             -300 ml           Physical Exam   Constitutional: She has a sickly appearance.   HENT:   Head: Normocephalic.   Nose: Nose normal.   Eyes: Conjunctivae are normal.   Cardiovascular: Normal rate, normal heart sounds and intact distal pulses.  An irregularly irregular rhythm present.   [facility] s1/2   Pulmonary/Chest: Effort normal and breath sounds normal.   Abdominal: Soft. She exhibits no mass. There is no tenderness.   Musculoskeletal: She exhibits edema (stasis changes, nonpitting edema).   Skin: Skin is warm and dry. No erythema.   Psychiatric: She is noncommunicative.   Vitals reviewed.      Results Review:        Results from last 7 days  Lab Units 01/21/18  0450 01/20/18  1036   SODIUM mmol/L 143 141   POTASSIUM mmol/L 3.5 4.4   CHLORIDE mmol/L 104 103   CO2 mmol/L 26.1 26.3   BUN mg/dL 9 10   CREATININE mg/dL 0.78 0.90   GLUCOSE mg/dL 96 90   CALCIUM mg/dL 9.0 9.6       Results from last 7 days  Lab Units 01/21/18  0450 01/20/18  1036   CK TOTAL U/L  --  91   TROPONIN T ng/mL 0.339* 0.136*       Results from last 7 days  Lab Units 01/21/18  0450 01/20/18  1036   WBC 10*3/mm3 4.36* 4.10*   HEMOGLOBIN g/dL 12.5 13.2   HEMATOCRIT % 38.8 38.0   PLATELETS 10*3/mm3 109* 143       Results from last 7 days  Lab Units 01/21/18  0450 01/20/18  1036   INR  2.58* 3.03*       Results from last 7 days  Lab Units  01/21/18  0450   CHOLESTEROL mg/dL 184           Results from last 7 days  Lab Units 01/21/18  0450   CHOLESTEROL mg/dL 184   TRIGLYCERIDES mg/dL 98   HDL CHOL mg/dL 44       I reviewed the patient's new clinical results.  I personally viewed and interpreted the patient's EKG/Telemetry data        Medication Review:     aspirin 300 mg Rectal Daily   atorvastatin 10 mg Oral Nightly   ceftriaxone 1 g Intravenous Q24H   Divalproex Sodium 125 mg Oral Q12H   donepezil 10 mg Oral Nightly   furosemide 40 mg Intravenous Q12H   ipratropium-albuterol 3 mL Nebulization Q4H - RT   isosorbide dinitrate 10 mg Oral Q8H   pantoprazole 40 mg Oral Q AM   potassium chloride 40 mEq Oral Once   risperiDONE 0.25 mg Oral Daily            Assessment/Plan     1.  Elevated troponin -- her Tn continues to rise.  An echo shows normal LV systolic function and wall motion.  She is quite simply not a candidate for any further evaluation.  She should not be on a BB due to bradycardia upon arrival.  I've ordered aspirin/atorvastatin/nitrates but she's unable to take any PO.    2.  Permanent AF -- rate a little slow on arrival but fine now.  She's anticoagulated.    3.  Mechanical mitral and aortic valves -- continue warfarin.  Normal function by echo.    What are the goals of care here?  Is this an acute change for her?  I will defer this to the primary team.  I don't have anything further to offer and will see PRN.     Wolf Rocha MD  01/21/18  12:19 PM

## 2018-01-22 NOTE — PLAN OF CARE
Problem: Patient Care Overview (Adult)  Goal: Plan of Care Review  Outcome: Ongoing (interventions implemented as appropriate)   01/22/18 0214   Coping/Psychosocial Response Interventions   Plan Of Care Reviewed With patient   Patient Care Overview   Progress no change   Outcome Evaluation   Outcome Summary/Follow up Plan Pt remains q2 turn. Purewick external catheter in place. Oral care provided. Pt was able to take two of her home meds. Now appears to be resting comfortably.     Goal: Adult Individualization and Mutuality  Outcome: Ongoing (interventions implemented as appropriate)    Goal: Discharge Needs Assessment  Outcome: Ongoing (interventions implemented as appropriate)      Problem: Acute Coronary Syndrome (ACS) (Adult)  Goal: Signs and Symptoms of Listed Potential Problems Will be Absent or Manageable (Acute Coronary Syndrome)  Outcome: Ongoing (interventions implemented as appropriate)      Problem: Pressure Ulcer Risk (Joe Scale) (Adult,Obstetrics,Pediatric)  Goal: Identify Related Risk Factors and Signs and Symptoms  Outcome: Ongoing (interventions implemented as appropriate)    Goal: Skin Integrity  Outcome: Ongoing (interventions implemented as appropriate)      Problem: Fall Risk (Adult)  Goal: Identify Related Risk Factors and Signs and Symptoms  Outcome: Ongoing (interventions implemented as appropriate)    Goal: Absence of Falls  Outcome: Ongoing (interventions implemented as appropriate)

## 2018-01-22 NOTE — PROGRESS NOTES
"Daily progress note    Chief complaint  Doing same  Denies any chest pain    History of present illness  88-year-old white female with history of chronic atrial fibrillation on anticoagulation other medical problem dementia brain tumor osteoarthritis osteoporosis who is a nursing home resident sent to the [facility] emergency room with mental status changes.  Patient is very confused top minimally complain of some chest pain shortness of breath.  Patient workup in ER revealed elevated troponin level consistent with non-ST elevation MI and UTI admitted for management.  No family member available.  Most of the history obtained from the chart and staff will record.  Per nursing staff no fever chills no cough congestion or nausea vomiting diarrhea.     REVIEW OF SYSTEMS  Review of Systems   Unable to perform ROS: Dementia   Cardiovascular: Positive for chest pain.      PHYSICAL EXAM  Blood pressure 122/60, pulse (!) 49, temperature 97.5 °F (36.4 °C), temperature source Oral, resp. rate 16, height 157.5 cm (62.01\"), weight 76.9 kg (169 lb 8 oz), SpO2 93 %.    Constitutional: She is well-developed, well-nourished, and in no distress. She appears distressed.   Head: Normocephalic and atraumatic.   Eyes: EOM are normal. Pupils are equal, round, and reactive to light.   Neck: Normal range of motion. Neck supple.   Cardiovascular: Regular rhythm.  Bradycardia present.    Murmur (2/6 ejection) heard.  Pulmonary/Chest: She is in respiratory distress (mild). She has wheezes (mild expiratory ). She has rhonchi in the left lower field.   Abdominal: Soft. There is no tenderness. There is no rebound and no guarding.   Musculoskeletal: Normal range of motion. She exhibits no edema.   Neurological: She is alert. She has normal sensation and normal strength.   Skin: Skin is warm and dry. No rash noted.   Psychiatric: Mood and affect normal.     LAB RESULTS  Lab Results (last 24 hours)     Procedure Component Value Units Date/Time "    Protime-INR [[id number]]  (Abnormal) Collected:  01/21/18 1626    Specimen:  Blood Updated:  01/21/18 1651     Protime 26.5 (H) Seconds      INR 2.54 (H)    POC Glucose Once [[id number]]  (Normal) Collected:  01/21/18 2010    Specimen:  Blood Updated:  01/21/18 2012     Glucose 117 mg/dL     Narrative:       Meter: [id number] : [id number] Janna Trinh    CBC & Differential [[id number]] Collected:  01/22/18 0510    Specimen:  Blood Updated:  01/22/18 0533    Narrative:       The following orders were created for panel order CBC & Differential.  Procedure                               Abnormality         Status                     ---------                               -----------         ------                     CBC Auto Differential[[id number]]        Abnormal            Final result                 Please view results for these tests on the individual orders.    CBC Auto Differential [[id number]]  (Abnormal) Collected:  01/22/18 0510    Specimen:  Blood Updated:  01/22/18 0533     WBC 3.93 (L) 10*3/mm3      RBC 4.17 10*6/mm3      Hemoglobin 12.9 g/dL      Hematocrit 38.7 %      MCV 92.8 fL      MCH 30.9 pg      MCHC 33.3 g/dL      RDW 12.8 %      RDW-SD 43.1 fl      MPV 9.7 fL      Platelets 117 (L) 10*3/mm3      Neutrophil % 70.7 %      Lymphocyte % 17.8 (L) %      Monocyte % 10.2 %      Eosinophil % 1.0 %      Basophil % 0.3 %      Immature Grans % 0.0 %      Neutrophils, Absolute 2.78 10*3/mm3      Lymphocytes, Absolute 0.70 (L) 10*3/mm3      Monocytes, Absolute 0.40 10*3/mm3      Eosinophils, Absolute 0.04 10*3/mm3      Basophils, Absolute 0.01 10*3/mm3      Immature Grans, Absolute 0.00 10*3/mm3     Protime-INR [[id number]]  (Abnormal) Collected:  01/22/18 0510    Specimen:  Blood Updated:  01/22/18 0543     Protime 26.6 (H) Seconds      INR 2.55 (H)    BNP [[id number]]  (Abnormal) Collected:  01/22/18 0510    Specimen:  Blood Updated:  01/22/18 0553     proBNP 2173.0 (H) pg/mL     Narrative:       Among  patients with dyspnea, NT-proBNP is highly sensitive for the detection of acute congestive heart failure. In addition NT-proBNP of <300 pg/ml effectively rules out acute congestive heart failure with 99% negative predictive value.    Basic Metabolic Panel [[id number]]  (Abnormal) Collected:  01/22/18 0510    Specimen:  Blood Updated:  01/22/18 0608     Glucose 100 (H) mg/dL      BUN 14 mg/dL      Creatinine 0.75 mg/dL      Sodium 145 mmol/L      Potassium 3.5 mmol/L      Chloride 105 mmol/L      CO2 26.7 mmol/L      Calcium 9.0 mg/dL      eGFR Non African Amer 73 mL/min/1.73      BUN/Creatinine Ratio 18.7     Anion Gap 13.3 mmol/L     Narrative:       The MDRD GFR formula is only valid for adults with stable renal function between ages 18 and 70.    Blood Culture - Blood, [[id number]]  (Normal) Collected:  01/20/18 1036    Specimen:  Blood from Arm, Left Updated:  01/22/18 1046     Blood Culture No growth at 2 days    Blood Culture - Blood, [[id number]]  (Normal) Collected:  01/20/18 1138    Specimen:  Blood from Arm, Left Updated:  01/22/18 1201     Blood Culture No growth at 2 days        Imaging Results (last 24 hours)     ** No results found for the last 24 hours. **        EKG                                                  Rhythm/Rate: Afib/40  P waves and [place]: Normal  QRS, axis: Q waves V1 and V2   ST and T waves: Nonspecific ST changes      Current Facility-Administered Medications:   •  aspirin chewable tablet 81 mg, 81 mg, Oral, Daily, Alvin Calderon MD, 81 mg at 01/22/18 0824  •  atorvastatin (LIPITOR) tablet 10 mg, 10 mg, Oral, Nightly, Alvin Calderon MD  •  cefTRIAXone (ROCEPHIN) IVPB 1 g, 1 g, Intravenous, Q24H, Alvin Calderon MD, Last Rate: 100 mL/hr at 01/21/18 1609, 1 g at 01/21/18 1609  •  Divalproex Sodium (DEPAKOTE SPRINKLE) capsule 125 mg, 125 mg, Oral, Q12H, Alvin Calderon MD, 125 mg at 01/22/18 0823  •  donepezil (ARICEPT) tablet 10 mg, 10 mg, Oral, Nightly, Alvin Ahmed, MD, 10 mg at 01/21/18 5868  •   furosemide (LASIX) injection 40 mg, 40 mg, Intravenous, Q12H, Mayur Calderon MD, 40 mg at 01/22/18 0503  •  ipratropium-albuterol (DUO-NEB) nebulizer solution 3 mL, 3 mL, Nebulization, Q4H - RT, Mayur Calderon MD, 3 mL at 01/22/18 1415  •  isosorbide dinitrate (ISORDIL) tablet 5 mg, 5 mg, Oral, Q8H, Mayur Calderon MD, 5 mg at 01/22/18 0823  •  pantoprazole (PROTONIX) EC tablet 40 mg, 40 mg, Oral, Q AM, Mayur Calderon MD, 40 mg at 01/22/18 0827  •  risperiDONE (risperDAL) tablet 0.25 mg, 0.25 mg, Oral, Daily, Mayur Calderon MD, 0.25 mg at 01/22/18 0824  •  Insert peripheral IV, , , Once **AND** sodium chloride 0.9 % flush 10 mL, 10 mL, Intravenous, PRN, Juma Diaz MD  •  warfarin (COUMADIN) tablet 1 mg, 1 mg, Oral, Daily, Mayur Calderon MD, 1 mg at 01/21/18 1613     ASSESSMENT  Acute UTI  Chest pain with elevated troponin consistent with non-ST elevation MI  Dementia  Chronic atrial fibrillation 90 coagulation with therapeutic INR  Acute  on chronic diastolic CHF  Hyperlipidemia  Gastroesophageal reflux disease  DNR    PLAN  CPM  Supplement oxygen nitroglycerin aspirin beta blocker  AC  Diuresis   Cardiac consult  Empiric antibiotics  Continue nursing home medications   Stress ulcer prophylaxis  DNR  Follow closely further recommendation according to hospital course    MAYUR CALDERON MD

## 2018-01-22 NOTE — SIGNIFICANT NOTE
"   01/22/18 1510   Rehab Treatment   Discipline physical therapist   Rehab Evaluation   Evaluation Not Performed patient/family declined evaluation;unable to evaluate, medical status change  (PT attempted therapy -- began assessing ROM, pt refused; tried this pm and RN said pt was just cleaned up and \"not happy\" with nursing, pt also thought rest was best for pt. )   Recommendation   PT - Next Appointment 01/23/18     "

## 2018-01-22 NOTE — SIGNIFICANT NOTE
01/22/18 0852   Rehab Treatment   Discipline occupational therapist   Rehab Evaluation   Evaluation Not Performed other (see comments)  (Attempt OT eval but when getting pt to EOB she began adamently refusing and req to be left alone.)   Recommendation   OT - Next Appointment 01/23/18

## 2018-01-22 NOTE — PROGRESS NOTES
Discharge Planning Assessment  [facility]     Patient Name: Angie Mckeon  MRN: [id number]  Today's Date: 1/22/2018    Admit Date: 1/20/2018          Discharge Needs Assessment       01/22/18 1717    Living Environment    Lives With facility resident    Living Arrangements other (see comments)   [facility]    Home Accessibility no concerns    Type of Financial/Environmental Concern none    Transportation Available ambulance    Living Environment    Provides Primary Care For no one    Quality Of Family Relationships helpful;involved;supportive    Able to Return to Prior Living Arrangements yes    Discharge Needs Assessment    Concerns To Be Addressed discharge planning concerns    Equipment Currently Used at Home bipap/ cpap;walker, rolling    Discharge Facility/Level Of Care Needs other (see comments)   memory care    Discharge Disposition other (see comments)   memory care    Discharge Planning Comments Return to [facility] via ambulance            Discharge Plan       01/22/18 1717    Case Management/Social Work Plan    Plan Return to [facility] via ambulance    Patient/Family In Agreement With Plan yes    Additional Comments IMM letter checked. CCP spoke with pt's sister/emergency contact (Ashleyskylar Reid, [phone]) re: d/c planning due to pt's dementia. Facesheet verified. CCP role explained. Pt resides at [facility] where her sister reports she receives assistance with bathing, medication management, and meal preparation. Pt uses a walker and a CPAP at facility. Pt has reportedly had no past home health and has had past sub-acute rehab at [facility]. Pt's pharmacy is [facility] in [place], [place]. Pt requires ambulance transport due to dementia, per sister. CCP awaiting return contact from [facility] ([place]) to confirm pt can return. Pt's sister uncertain of d/c needs at this time. CCP to follow to assist should d/c needs arise. Conchita Calderon LCSW        Discharge  Placement     Facility/Agency Request Status Selected? Address Phone Number Fax Number    [facility] Pending - No Request Sent     [address], [facility] [address] [phone]                Demographic Summary       01/22/18 1716    Referral Information    Admission Type inpatient    Arrived From admitted as an inpatient    Referral Source nursing;physician    Reason For Consult discharge planning    Record Reviewed medical record    Primary Care Physician Information    Name Steve Hicks MD            Functional Status       01/22/18 1717    Functional Status Current    Ambulation 4-->completely dependent    Transferring 4-->completely dependent    Toileting 4-->completely dependent    Bathing 4-->completely dependent    Dressing 4-->completely dependent    Eating 4-->completely dependent    Communication 2-->difficulty understanding and speaking (not related to language barrier)    Swallowing (if score 2 or more for any item, consult Rehab Services) 2-->difficulty swallowing liquids/foods    Functional Status Prior    Ambulation 3-->assistive equipment and person    Transferring 3-->assistive equipment and person    Toileting 3-->assistive equipment and person    Bathing 3-->assistive equipment and person    Dressing 3-->assistive equipment and person    Eating 0-->independent    Swallowing 0-->swallows foods/liquids without difficulty    Cognitive/Perceptual/Developmental    Current Mental Status/Cognitive Functioning unable to assess            Psychosocial     None            Abuse/Neglect     None            Legal     None            Substance Abuse     None            Patient Forms     None          Lois Calderon LCSW

## 2018-01-22 NOTE — PROGRESS NOTES
"Adult Nutrition  Assessment/PES    Patient Name:  Angie Mckeon  YOB: 1929  MRN: [id number]  Admit Date:  1/20/2018    Assessment Date:  1/22/2018    Comments:  Screened for skin risk/Joe score of 12. Pt does not have any skin issues noted. Will monitor PO intake and intervene as warranted.           Reason for Assessment       01/22/18 1155    Reason for Assessment    Reason For Assessment/Visit skin risk    Cardiac PAF;MI    Neurological Dementia;Dysphagia              Nutrition/Diet History       01/22/18 1157    Nutrition/Diet History    Other From NH. Has been minimally responsive at times. SLP eval noted.             Anthropometrics       01/22/18 1157    Anthropometrics    RD Documented Current Weight  76.7 kg (169 lb)    Anthropometrics (Special Considerations)    Height Used for Calculations 1.575 m (5' 2\")    RD Calculated BMI (kg/m2) 30    Usual Body Weight (UBW)    Weight Loss Time Frame there has been no significant wt variance    Body Mass Index (BMI)    BMI Grade 30 - 34.9- obesity - grade I            Labs/Tests/Procedures/Meds       01/22/18 1158    Labs/Tests/Procedures/Meds    Diagnostic Test/Procedure Review reviewed    Labs/Tests Review Reviewed    Medication Review Reviewed, pertinent;Antibiotic;Antacid;Anticoag    Significant Vitals reviewed            Physical Findings       01/22/18 1158    Physical Findings/Assessment    Additional Documentation Physical Appearance (Group)    Physical Appearance    Overall Physical Appearance overweight;other (see comments)   garbled speech    Skin other (see comments)   skin intact; joe 12            Estimated/Assessed Needs       01/22/18 1159    Calculation Measurements    Weight Used For Calculations 76.7 kg (169 lb)    Estimated/Assessed Energy Needs    Energy Need Method Kcal/kg    kcal/kg 25    25 Kcal/Kg (kcal) 1916.45    Estimated/Assessed Protein Needs    Weight Used for Protein Calculation 76.7 kg (169 lb)    Protein " (gm/kg) 1.0    1.0 Gm Protein (gm) 76.66            Nutrition Prescription Ordered       01/22/18 1200    Nutrition Prescription PO    Current PO Diet Dysphagia    Dysphagia Level 2  Pureed    Fluid Consistency Nectar/syrup thick            Evaluation of Received Nutrient/Fluid Intake       01/22/18 1200    PO Evaluation    Number of Days PO Intake Evaluated Insufficient Data    % PO Intake diet just advanced            Problem/Interventions:        Problem 1       01/22/18 1201    Nutrition Diagnoses Problem 1    Problem 1 Predicted Suboptimal Intake    Etiology (related to) Functional Diagnosis    Functional Diagnosis Cognitive deficit;Dysphagia                    Intervention Goal       01/22/18 1202    Intervention Goal    General Maintain nutrition;Disease management/therapy    PO Tolerate PO;PO intake (%)    PO Intake % 50 %    Weight No significant weight loss            Nutrition Intervention       01/22/18 1202    Nutrition Intervention    RD/Tech Action Follow Tx progress;Care plan reviewd;Encourage intake;Await begin PO              Education/Evaluation       01/22/18 1202    Monitor/Evaluation    Monitor Per protocol        Electronically signed by:  Sonya Mcdaniel RD  01/22/18 12:02 PM

## 2018-01-23 NOTE — PLAN OF CARE
Problem: Inpatient Physical Therapy  Goal: Gait Training Goal LTG- PT   01/23/18 1131   Gait Training PT LTG   Gait Training Goal PT LTG, Date Established 01/23/18   Gait Training Goal PT LTG, Time to Achieve 1 wk   Gait Training Goal PT LTG, [place] Level contact guard assist;minimum assist (75% patient effort)   Gait Training Goal PT LTG, Assist Device walker, rolling   Gait Training Goal PT LTG, Distance to Achieve 50

## 2018-01-23 NOTE — THERAPY EVALUATION
Acute Care - Physical Therapy Initial Evaluation  [facility]     Patient Name: Angie Mckeon  : 1929  MRN: [id number]  Today's Date: 2018   Onset of Illness/Injury or Date of Surgery Date: 18  Date of Referral to PT: 18         Admit Date: 2018     Visit Dx:    ICD-10-CM ICD-9-CM   1. Non-STEMI (non-ST elevated myocardial infarction) I21.4 410.70   2. Muscle weakness (generalized) M62.81 728.87     Patient Active Problem List   Diagnosis   • Permanent atrial fibrillation   • Dementia   • Elevated troponin     Past Medical History:   Diagnosis Date   • Dementia    • History of brain tumor     X2   • Osteoporosis    • Permanent atrial fibrillation    • Sleep apnea    • Stroke    • Tremor    • Valvular heart disease     s/p mechanical AVR/MVR     Past Surgical History:   Procedure Laterality Date   • CARDIAC VALVE REPLACEMENT     • HYSTERECTOMY     • JOINT REPLACEMENT      SULTANA. HIP          PT ASSESSMENT (last 72 hours)      PT Evaluation       18 0905 18 0405    Rehab Evaluation    Document Type evaluation  -LB     Subjective Information no complaints;agree to therapy  -LB     Patient Effort, Rehab Treatment good  -LB     Symptoms Noted During/After Treatment fatigue  -LB     General Information    Patient Profile Review yes  -LB     Onset of Illness/Injury or Date of Surgery Date 18  -LB     General Observations Pt with 02 at 2L and Pure[place] in place.   -LB     Pertinent History Of Current Problem Pt was admitted with mental status changes on 18. Pt was diagnosed with a non-STEMI and UTI.   -LB     Equipment Currently Used at Home walker, rolling  -LB     Living Environment    Lives With facility resident  -LB     Living Arrangements other (see comments)   [facility]   -LB     Home Accessibility no concerns  -LB     Clinical Impression    Date of Referral to PT 18  -LB     PT Diagnosis generalized weakness  -LB     Prognosis good   -LB      Criteria for Skilled Therapeutic Interventions Met yes  -LB     Rehab Potential good, to achieve stated therapy goals  -LB     Predicted Duration of Therapy Intervention (days/wks) 1 week   -LB     Vital Signs    Pre SpO2 (%) 95  -LB     O2 Delivery Pre Treatment supplemental O2   2L  -LB     Intra SpO2 (%) 94  -LB     O2 Delivery Intra Treatment supplemental O2   2L  -LB     Post SpO2 (%) 92  -LB     O2 Delivery Post Treatment supplemental O2   2L  -LB     Pre Patient Position Supine  -LB     Intra Patient Position Sitting  -LB     Post Patient Position Supine  -LB     Pain Assessment    Pain Assessment FLACC  -LB     FLACC (Face, Legs, Activity, Crying, Consolability)    Pain Rating: FLACC (Rest) - Face 1  -LB     Pain Rating: FLACC (Rest) - Legs 1  -LB     Pain Rating: FLACC (Rest) - Activity 1  -LB     Pain Rating: FLACC (Rest) - Cry 1  -LB     Pain Rating: FLACC (Rest) - Consolability 0  -LB     Score: FLACC (Rest) 4  -LB     Cognitive Assessment/Intervention    Current Cognitive/Communication Assessment impaired  -LB     Orientation Status oriented to;person;situation  -LB     Follows Commands/Answers Questions 75% of the time;able to follow single-step instructions  -LB     Personal Safety Interventions fall prevention program maintained;gait belt;nonskid shoes/slippers when out of bed  -LB     ROM (Range of Motion)    General ROM Detail AROM- WFL in LE and UE  -LB     MMT (Manual Muscle Testing)    General MMT Assessment Detail LE overall 3+/5   -LB     Muscle Tone Assessment    JAMISONE Muscle Tone Assessment  --   tremor at rest noted  -MD TORRES Muscle Tone Assessment  --   tremor at rest noted  -MD    Bed Mobility, Assessment/Treatment    Bed Mob, Supine to Sit, [place] moderate assist (50% patient effort);maximum assist (25% patient effort)  -LB     Bed Mob, Sit to Supine, [place] moderate assist (50% patient effort);2 person assist required  -LB     Bed Mobility, Safety Issues decreased use of  arms for pushing/pulling;decreased use of legs for bridging/pushing  -LB     Bed Mobility, Impairments impaired balance;strength decreased  -LB     Transfer Assessment/Treatment    Transfers, Sit-Stand [place] moderate assist (50% patient effort);2 person assist required;minimum assist (75% patient effort)  -LB     Transfers, Stand-Sit [place] minimum assist (75% patient effort);2 person assist required  -LB     Transfers, Sit-Stand-Sit, Assist Device rolling walker  -LB     Transfer, Comment Pt stood and stepped 3 steps to the head of bed with moderate of 1 with the rolling walker.  -LB     Gait Assessment/Treatment    Gait, [place] Level not tested  -LB     Therapy Exercises    Bilateral Lower Extremities 5 reps;ankle pumps/circles;LAQ  -LB     Positioning and Restraints    Pre-Treatment Position in bed  -LB     Post Treatment Position bed  -LB     In Bed notified nsg;encouraged to call for assist;call light within reach;side rails up x3;waffle boots/both;R multipodus;L multipodus  -LB       01/22/18 2340 01/22/18 1948    Muscle Tone Assessment    FLORES Muscle Tone Assessment --   tremor at rest noted  -MD --   tremor at rest noted  -MD TORRES Muscle Tone Assessment --   tremor at rest noted  -MD --   tremor at rest noted  -MD      01/22/18 1930 01/22/18 1717    General Information    Equipment Currently Used at Home walker, rolling  -MD bipap/ cpap;walker, rolling  -KEE    Living Environment    Lives With  facility resident  -    Living Arrangements  other (see comments)   [facility]  -    Home Accessibility  no concerns  -    Type of Financial/Environmental Concern  none  -    Transportation Available  ambulance  -      01/22/18 1600 01/22/18 1510    Rehab Evaluation    Evaluation Not Performed  patient/family declined evaluation;unable to evaluate, medical status change   PT attempted therapy -- began assessing ROM, pt refused; tried this pm and RN said pt was just cleaned up  "and \"not happy\" with nursing, pt also thought rest was best for pt.   -SAMMY    Muscle Tone Assessment    LUE Muscle Tone Assessment --   tremor at rest noted  -AD     RUE Muscle Tone Assessment --   tremor at rest noted  -AD       01/22/18 1400 01/22/18 1200    Muscle Tone Assessment    LUE Muscle Tone Assessment --   tremor at rest noted  -AD --   tremor at rest noted  -AD    RUE Muscle Tone Assessment --   tremor at rest noted  -AD --   tremor at rest noted  -AD      01/22/18 1000 01/22/18 0852    Rehab Evaluation    Evaluation Not Performed  other (see comments)   Attempt OT eval but when getting pt to EOB she began adamently refusing and req to be left alone.  -SO    Muscle Tone Assessment    LUE Muscle Tone Assessment --   tremor at rest noted  -AD     RUE Muscle Tone Assessment --   tremor at rest noted  -AD       01/22/18 0800 01/21/18 1600    Rehab Evaluation    Document Type  evaluation  -SA    Subjective Information  no complaints;weakness;fatigue  -SA    Patient Effort, Rehab Treatment  fair  -SA    Symptoms Noted During/After Treatment  fatigue  -SA    Cognitive Assessment/Intervention    Current Cognitive/Communication Assessment  impaired  -SA    Orientation Status  oriented to;person  -SA    Follows Commands/Answers Questions  50% of the time;75% of the time;able to follow single-step instructions;needs cueing;needs increased time;needs repetition  -SA    Muscle Tone Assessment    LUE Muscle Tone Assessment --   tremor at rest noted  -AD     RUE Muscle Tone Assessment --   tremor at rest noted  -AD       01/21/18 0000 01/20/18 2000    Muscle Tone Assessment    LUE Muscle Tone Assessment --   tremor at rest noted  -LD --   tremor at rest noted  -LD    RUE Muscle Tone Assessment --   tremor at rest noted  -LD --   tremor at rest noted  -LD      01/20/18 1730 01/20/18 1726    General Information    Equipment Currently Used at Home  walker, rolling  -KH    Living Environment    Lives With facility resident  " -     Living Arrangements extended care facility   [facility]  -       01/20/18 1426       Muscle Tone Assessment    Muscle Tone Assessment --  -     LUE Muscle Tone Assessment --   tremor at rest noted  -     RUE Muscle Tone Assessment --   tremor at rest noted  -       User Key  (r) = Recorded By, (t) = Taken By, (c) = Cosigned By    Initials Name Provider Type    LB Kandi Clifford, PT Physical Therapist    SO Calista Hernandez, OTR Occupational Therapist    SAMMY Demarco, PT Physical Therapist    MD Nieves Blake, RN Registered Nurse    DAMASO Olivares, RN Registered Nurse    SHARRON Douglass, RN Registered Nurse    JOCELYN Marie, RN Registered Nurse    KEE Calderon [facility]     SA Sosa Madden MS CCC-SLP Speech and Language Pathologist          Physical Therapy Education     Title: PT OT SLP Therapies (Active)     Topic: Physical Therapy (Active)     Point: Mobility training (Active)    Learning Progress Summary    Learner Readiness Method Response Comment Documented by Status   Patient Acceptance E NR   01/23/18 1127 Active                      User Key     Initials Effective Dates Name Provider Type Discipline     04/24/15 -  Kandi Clifford, PT Physical Therapist PT                PT Recommendation and Plan  Planned Therapy Interventions: bed mobility training, transfer training, gait training  PT Frequency: daily  Plan of Care Review  Plan Of Care Reviewed With: patient  Outcome Summary/Follow up Plan: Pt cooperative with evaluation with bed mobility abd transfers. Pt demonstrating deficits with bed mobility and transfers. Pt did not feel up to walking this am. Pt would benefit from skilled PT.           IP PT Goals       01/23/18 1131 01/23/18 1130       Bed Mobility PT LTG    Bed Mobility PT LTG, Date Established  01/23/18  -LB     Bed Mobility PT LTG, Time to Achieve  1 wk  -LB     Bed Mobility PT LTG, [place] Level  contact guard  assist;minimum assist (75% patient effort)  -LB     Transfer Training PT LTG    Transfer Training PT LTG, Time to Achieve  1 wk  -LB     Transfer Training PT LTG, [place] Level  contact guard assist;minimum assist (75% patient effort)  -LB     Transfer Training PT LTG, Assist Device  walker, rolling  -LB     Gait Training PT LTG    Gait Training Goal PT LTG, Date Established 01/23/18  -LB      Gait Training Goal PT LTG, Time to Achieve 1 wk  -LB      Gait Training Goal PT LTG, [place] Level contact guard assist;minimum assist (75% patient effort)  -LB      Gait Training Goal PT LTG, Assist Device walker, rolling  -LB      Gait Training Goal PT LTG, Distance to Achieve 50  -LB        User Key  (r) = Recorded By, (t) = Taken By, (c) = Cosigned By    Initials Name Provider Type    JEMMA Clifford PT Physical Therapist                Outcome Measures       01/23/18 0905          How much help from another person do you currently need...    Turning from your back to your side while in flat bed without using bedrails? 2  -LB      Moving from lying on back to sitting on the side of a flat bed without bedrails? 2  -LB      Moving to and from a bed to a chair (including a wheelchair)? 2  -LB      Standing up from a chair using your arms (e.g., wheelchair, bedside chair)? 2  -LB      Climbing 3-5 steps with a railing? 1  -LB      To walk in hospital room? 2  -LB      AM-PAC 6 Clicks Score 11  -LB      Functional Assessment    Outcome Measure Options AM-PAC 6 Clicks Basic Mobility (PT)  -LB        User Key  (r) = Recorded By, (t) = Taken By, (c) = Cosigned By    Initials Name Provider Type    JEMMA Clifford PT Physical Therapist           Time Calculation:         PT Charges       01/23/18 1133          Time Calculation    Start Time 0905  -LB      Stop Time 0923  -LB      Time Calculation (min) 18 min  -LB      PT Received On 01/23/18  -LB      PT - Next Appointment 01/24/18  -LB      PT Goal Re-Cert Due Date  01/30/18  -JEMMA        User Key  (r) = Recorded By, (t) = Taken By, (c) = Cosigned By    Initials Name Provider Type    JEMMA Clifford, PT Physical Therapist          Therapy Charges for Today     Code Description Service Date Service Provider Modifiers Qty    [id number] HC PT EVAL MOD COMPLEXITY 2 1/23/2018 Kandi Clifford, PT GP 1          PT G-Codes  Outcome Measure Options: AM-PAC 6 Clicks Basic Mobility (PT)      Kandi Clifford, PT  1/23/2018

## 2018-01-23 NOTE — PROGRESS NOTES
Continued Stay Note  [facility]     Patient Name: Angie Mckeon  MRN: [id number]  Today's Date: 1/23/2018    Admit Date: 1/20/2018          Discharge Plan       01/23/18 1404    Case Management/Social Work Plan    Plan Find placement. [facility] unable to accept. Patient on pureed diet.    Additional Comments Spoke with patient's sister,  Ashley Reid --[phone]. Explained  that Patient is ready for discharge and [facility] can not readmit . Options discussed . Ms Reid  agreeable to [facility], [facility] and [facility]. Placed the 3 referrals in reps' in baskets. Await call back from Sosa/ Miriam. [facility] does not have a room at [place]. Could consider patient for [facility] . [facility] can  not accept any patient's at [facility] that could be long term care. . [facility] was also concerned with  patient's diet. Updated Ms Reid. She would consider [facility] [place] [facility]. Inquiries sent. CCP to follow............  FAITH Cabrera              Discharge Codes     None            FAITH Cabrera

## 2018-01-23 NOTE — PLAN OF CARE
Problem: Inpatient Physical Therapy  Goal: Bed Mobility Goal LTG- PT   01/23/18 1130   Bed Mobility PT LTG   Bed Mobility PT LTG, Date Established 01/23/18   Bed Mobility PT LTG, Time to Achieve 1 wk   Bed Mobility PT LTG, [place] Level contact guard assist;minimum assist (75% patient effort)     Goal: Transfer Training Goal 1 LTG- PT   01/23/18 1130   Transfer Training PT LTG   Transfer Training PT LTG, Time to Achieve 1 wk   Transfer Training PT LTG, [place] Level contact guard assist;minimum assist (75% patient effort)   Transfer Training PT LTG, Assist Device walker, rolling

## 2018-01-23 NOTE — PROGRESS NOTES
"Daily progress note    Chief complaint  Doing same  No new issues    History of present illness  88-year-old white female with history of chronic atrial fibrillation on anticoagulation other medical problem dementia brain tumor osteoarthritis osteoporosis who is a nursing home resident sent to the [facility] emergency room with mental status changes.  Patient is very confused top minimally complain of some chest pain shortness of breath.  Patient workup in ER revealed elevated troponin level consistent with non-ST elevation MI and UTI admitted for management.  No family member available.  Most of the history obtained from the chart and staff will record.  Per nursing staff no fever chills no cough congestion or nausea vomiting diarrhea.     REVIEW OF SYSTEMS  Review of Systems   Unable to perform ROS: Dementia   Cardiovascular: Positive for chest pain.      PHYSICAL EXAM  Blood pressure 118/60, pulse 50, temperature 97.6 °F (36.4 °C), temperature source Oral, resp. rate 18, height 157.5 cm (62.01\"), weight 71.9 kg (158 lb 9.6 oz), SpO2 95 %.    Constitutional: She is well-developed, well-nourished, and in no distress. She appears distressed.   Head: Normocephalic and atraumatic.   Eyes: EOM are normal. Pupils are equal, round, and reactive to light.   Neck: Normal range of motion. Neck supple.   Cardiovascular: Regular rhythm.  Bradycardia present.    Murmur (2/6 ejection) heard.  Pulmonary/Chest: She is in respiratory distress (mild). She has wheezes (mild expiratory ). She has rhonchi in the left lower field.   Abdominal: Soft. There is no tenderness. There is no rebound and no guarding.   Musculoskeletal: Normal range of motion. She exhibits no edema.   Neurological: She is alert. She has normal sensation and normal strength.   Skin: Skin is warm and dry. No rash noted.   Psychiatric: Mood and affect normal.     LAB RESULTS  Lab Results (last 24 hours)     Procedure Component Value Units Date/Time    " Protime-INR [[id number]]  (Abnormal) Collected:  01/23/18 0617    Specimen:  Blood Updated:  01/23/18 0700     Protime 27.5 (H) Seconds      INR 2.66 (H)    Basic Metabolic Panel [[id number]]  (Abnormal) Collected:  01/23/18 0617    Specimen:  Blood Updated:  01/23/18 0708     Glucose 107 (H) mg/dL      BUN 17 mg/dL      Creatinine 0.65 mg/dL      Sodium 140 mmol/L      Potassium 3.0 (L) mmol/L      Chloride 98 mmol/L      CO2 26.8 mmol/L      Calcium 9.2 mg/dL      eGFR Non African Amer 86 mL/min/1.73      BUN/Creatinine Ratio 26.2 (H)     Anion Gap 15.2 mmol/L     Narrative:       The MDRD GFR formula is only valid for adults with stable renal function between ages 18 and 70.    Blood Culture - Blood, [[id number]]  (Normal) Collected:  01/20/18 1036    Specimen:  Blood from Arm, Left Updated:  01/23/18 1046     Blood Culture No growth at 3 days    Blood Culture - Blood, [[id number]]  (Normal) Collected:  01/20/18 1138    Specimen:  Blood from Arm, Left Updated:  01/23/18 1201     Blood Culture No growth at 3 days        Imaging Results (last 24 hours)     ** No results found for the last 24 hours. **        EKG                                                  Rhythm/Rate: Afib/40  P waves and MA: Normal  QRS, axis: Q waves V1 and V2   ST and T waves: Nonspecific ST changes      Current Facility-Administered Medications:   •  aspirin chewable tablet 81 mg, 81 mg, Oral, Daily, Alvin Calderon MD, 81 mg at 01/23/18 0846  •  atorvastatin (LIPITOR) tablet 10 mg, 10 mg, Oral, Nightly, Alvin Calderon MD, 10 mg at 01/22/18 2118  •  cefTRIAXone (ROCEPHIN) IVPB 1 g, 1 g, Intravenous, Q24H, Alvin Calderon MD, Last Rate: 100 mL/hr at 01/22/18 1714, 1 g at 01/22/18 1714  •  Divalproex Sodium (DEPAKOTE SPRINKLE) capsule 125 mg, 125 mg, Oral, Q12H, Alvin Calderon MD, 125 mg at 01/23/18 0806  •  donepezil (ARICEPT) tablet 10 mg, 10 mg, Oral, Nightly, Alvin Calderon MD, 10 mg at 01/22/18 2111  •  furosemide (LASIX) injection 20 mg, 20 mg,  Intravenous, Q12H, Mayur Calderon MD, 20 mg at 01/23/18 0507  •  ipratropium-albuterol (DUO-NEB) nebulizer solution 3 mL, 3 mL, Nebulization, Q4H - RT, Mayur Calderon MD, 3 mL at 01/23/18 1041  •  isosorbide dinitrate (ISORDIL) tablet 5 mg, 5 mg, Oral, Q8H, Mayur Calderon MD, 5 mg at 01/23/18 0539  •  pantoprazole (PROTONIX) EC tablet 40 mg, 40 mg, Oral, Q AM, Mayur Calderon MD, 40 mg at 01/23/18 0539  •  risperiDONE (risperDAL) tablet 0.25 mg, 0.25 mg, Oral, Daily, Mayur Calderon MD, 0.25 mg at 01/23/18 0846  •  Insert peripheral IV, , , Once **AND** sodium chloride 0.9 % flush 10 mL, 10 mL, Intravenous, PRN, Juma Diaz MD  •  warfarin (COUMADIN) tablet 1 mg, 1 mg, Oral, Daily, Mayur Calderon MD, 1 mg at 01/22/18 1714     ASSESSMENT  Acute UTI  Chest pain with elevated troponin consistent with non-ST elevation MI  Dementia  Chronic atrial fibrillation 90 coagulation with therapeutic INR  Acute  on chronic diastolic CHF  Hyperlipidemia  Gastroesophageal reflux disease  DNR    PLAN  CPM  Supplement oxygen nitroglycerin aspirin beta blocker  AC  Diuresis   Empiric antibiotics  Continue nursing home medications   Stress ulcer prophylaxis  DNR  CARLY  once bed available    MAYUR CALDERON MD

## 2018-01-23 NOTE — PROGRESS NOTES
Continued Stay Note  [facility]     Patient Name: Angie Mckeon  MRN: [id number]  Today's Date: 1/23/2018    Admit Date: 1/20/2018          Discharge Plan       01/23/18 1533    Case Management/Social Work Plan    Plan Await decision from Brad Recinos    Additional Comments Spoke with Bandar Salinas. She will advise in AM  if room is available. A nurse Karen finley . Will follow in AM for facility decision.......  FAITH Cabrera      01/23/18 1516    Case Management/Social Work Plan    Additional Comments Spoke with [facility]. She would not have a bed for this patient [facility] does not have long term beds at her facilities. await decision from Brad Recinos. Packet started and placed on chart......  FAITH Cabrera      01/23/18 1404    Case Management/Social Work Plan    Plan Find placement. [facility] is unable to accept. Patient on pureed diet.    Additional Comments Spoke with patient's sister,  Ashley Reid --[phone]. Explained  that Patient is ready for discharge and [facility] can not readmit . Options discussed . Ms Reid  agreeable to [facility], [facility] and [facility]. Placed the 3 referrals in reps' in baskets. Await call back from Sosa/ [facility]. [facility] does not have a room at [place]. Could consider patient for [facility] . [facility] can  not accept any patient's at [facility] that could be long term care. . [facility] was also concerned with  patient's diet. Updated Ms Reid. She would consider [facility] and [facility]. Inquiries sent. [facility] to follow............  FAITH Cabrera              Discharge Codes     None        Expected Discharge Date and Time     Expected Discharge Date Expected Discharge Time    Jan 24, 2018             FAITH Cabrera

## 2018-01-23 NOTE — DISCHARGE PLACEMENT REQUEST
"Andreia Bhakta D (88 y.o. Female)     Date of Birth Social Security Number Address Home Phone MRN    04/13/1929  [address] [place] [id number] [phone] [id number]    Oriental orthodox Marital Status          [facility] Single       Admission Date Admission Type Admitting Provider Attending Provider Department, Room/Bed    1/20/18 Emergency Alvin Calderon MD Ahmed, Aftab, MD [address] [facility], 2211/1    Discharge Date Discharge Disposition Discharge Destination                      Attending Provider: Alvin Calderon MD     Allergies:  No Known Allergies    Isolation:  None   Infection:  None   Code Status:  Conditional    Ht:  157.5 cm (62.01\")   Wt:  71.9 kg (158 lb 9.6 oz)    Admission Cmt:  None   Principal Problem:  None                Active Insurance as of 1/20/2018     Primary Coverage     Payor Plan Insurance Group Employer/Plan Group    MEDICARE MEDICARE A & B      Payor Plan Address Payor Plan Phone Number Effective From Effective To    [address] [phone] 4/1/1994     [place] [id number]       Subscriber Name Subscriber Birth Date Member ID       ANDREIA BHAKTA 4/13/1929 [id number]                 Emergency Contacts      (Rel.) Home Phone Work Phone Mobile Phone    Ashley Reid (Sister) [phone] -- [phone]              "

## 2018-01-23 NOTE — SIGNIFICANT NOTE
01/23/18 0820   Rehab Treatment   Discipline occupational therapist   Rehab Evaluation   Evaluation Not Performed patient/family declined evaluation  (Pt ref 2x, notified nsg. Will attempt back if able.)   Recommendation   OT - Next Appointment 01/24/18

## 2018-01-23 NOTE — PLAN OF CARE
Problem: Patient Care Overview (Adult)  Goal: Plan of Care Review   01/23/18 1128   Coping/Psychosocial Response Interventions   Plan Of Care Reviewed With patient   Outcome Evaluation   Outcome Summary/Follow up Plan Pt cooperative with evaluation with bed mobility abd transfers. Pt demonstrating deficits with bed mobility and transfers. Pt did not feel up to walking this am. Pt would benefit from skilled PT.

## 2018-01-23 NOTE — CONSULTS
Purpose of the visit was to evaluate for: goals of care/advanced care planning. Spoke with RN and CCP as well as POA and discussed palliative care, goals of care, care options and [facility].      Assessment:  Patient is palliative care appropriate for community based services with [facility] or SNF with palliative care due to dementia, Afib, non-ST elevation MI, UTI at admission. Discussed going to nursing home and would patient want feeding tube. States they never talked about any of that.     Recommendations/Plan: Going back to nursing home today or tomorrow.  Met with sister and POA, Ashley Reid. She is unable to make any decision today until she talks with her other sister and patients daughter, who lives in [place].     Other Comments: Thank you for the referral.     Will follow.

## 2018-01-23 NOTE — PLAN OF CARE
Problem: Patient Care Overview (Adult)  Goal: Plan of Care Review  Outcome: Ongoing (interventions implemented as appropriate)   01/23/18 0638   Coping/Psychosocial Response Interventions   Plan Of Care Reviewed With patient   Patient Care Overview   Progress no change   Outcome Evaluation   Outcome Summary/Follow up Plan VSS. Low Joe score so q2 turn, waffle cushion in chair, waffle boots on, SCDs on. Skin intact. Purewick in place, changed this shift. Meds given crushed in applesauce without issue. Pt remains on pureed, NTL dysphagia diet. Frequent oral care performed this shift. No c/o. Will continue to monitor.

## 2018-01-23 NOTE — PROGRESS NOTES
Continued Stay Note  [facility]     Patient Name: Angie Mckeon  MRN: [id number]  Today's Date: 1/23/2018    Admit Date: 1/20/2018          Discharge Plan       01/23/18 1516    Case Management/Social Work Plan    Additional Comments Spoke with [facility]/ [facility]. She would not have a bed for this patient. [facility] does not have long term beds at her facilities. await decision from Brad Recinos.. Packet started and placed on chart......  FAITH Cabrera      01/23/18 1404    Case Management/Social Work Plan    Plan Find placement. [facility] is unable to accept. Patient on pureed diet.    Additional Comments Spoke with patient's sister,  Ashley Reid --[phone]. Explained  that Patient is ready for discharge and [facility] can not readmit . Options discussed . Ms Reid  agreeable to [facility], [facility] and [facility]. Placed the 3 referrals in reps' in baskets. Await call back from Didier Pineda. [facility] does not have a room at [place]. Could consider patient for [facility] . [facility] can  not accept any patient's at [facility] that could be long term care. . [facility] was also concerned with  patient's diet. Updated Ms Redi. She would consider [facility] and [facility]. Inquiries sent. [facility] to follow............  FATIH Cabrera              Discharge Codes     None        Expected Discharge Date and Time     Expected Discharge Date Expected Discharge Time    Jan 24, 2018             FAITH Cabrera

## 2018-01-24 NOTE — THERAPY TREATMENT NOTE
"Acute Care - Physical Therapy Treatment Note  [facility]     Patient Name: Angie Mckeon  : 1929  MRN: [id number]  Today's Date: 2018  Onset of Illness/Injury or Date of Surgery Date: 18  Date of Referral to PT: 18       Admit Date: 2018    Visit Dx:    ICD-10-CM ICD-9-CM   1. Non-STEMI (non-ST elevated myocardial infarction) I21.4 410.70   2. Muscle weakness (generalized) M62.81 728.87     Patient Active Problem List   Diagnosis   • Permanent atrial fibrillation   • Dementia   • Elevated troponin               Adult Rehabilitation Note       18 1200          Rehab Assessment/Intervention    Discipline physical therapist  -      Document Type therapy note (daily note)  -      Subjective Information agree to therapy  -      Patient Effort, Rehab Treatment good  -      Patient Response to Treatment pt resistant to PT, \" i dont think i want to do this.\" much encouragement to sit EOB  -      Recorded by [] Kandi Ramos, PT      Pain Assessment    Pain Assessment No/denies pain  -      Recorded by [] Kandi Ramos PT      Vision Assessment/Intervention    Visual Impairment WFL  -      Recorded by [] Kandi Ramos PT      Cognitive Assessment/Intervention    Current Cognitive/Communication Assessment impaired  -      Orientation Status oriented to;person;place  -      Follows Commands/Answers Questions 75% of the time;50% of the time;able to follow single-step instructions;needs cueing;needs increased time;needs repetition  -      Personal Safety decreased insight to deficits  -      Personal Safety Interventions fall prevention program maintained;gait belt;nonskid shoes/slippers when out of bed;supervised activity  -      Recorded by [] Kandi Ramos PT      Bed Mobility, Assessment/Treatment    Bed Mobility, Assistive Device bed rails;draw sheet  -      Bed Mob, Supine to Sit, [place] maximum assist (25% patient effort);verbal cues " required;nonverbal cues required (demo/gesture)  -      Bed Mob, Sit to Supine, [place] maximum assist (25% patient effort);verbal cues required;nonverbal cues required (demo/gesture)  -      Bed Mobility, Safety Issues cognitive deficits limit understanding  -      Bed Mobility, Impairments strength decreased;impaired balance  -      Recorded by [] Kandi Ramos, PT      Transfer Assessment/Treatment    Transfer, Comment refusd  -      Recorded by [] Kandi Ramos PT      Gait Assessment/Treatment    Gait, [place] Level not tested  -      Recorded by [] Kandi Ramos, PT      Motor Skills/Interventions    Additional Documentation Balance Skills Training (Group)  -      Recorded by [] Kandi Ramos, PT      Balance Skills Training    Sitting-Level of Assistance Moderate assistance  -      Sitting-Balance Support Left upper extremity supported;Right upper extremity supported  -      Sitting-Balance Activities Trunk control activities  -      Sitting # of Minutes 30 sec, much encouragement req to attempt sitting EOB  -      Recorded by [] Kandi Ramos, PT      Therapy Exercises    Bilateral Lower Extremities AROM:;5 reps;supine;ankle pumps/circles  -      Recorded by [] Kandi Ramos, PT      Positioning and Restraints    Pre-Treatment Position in bed  -      Post Treatment Position bed  -LH      In Bed supine;call light within reach;encouraged to call for assist;exit alarm on;SCD pump applied  -      Recorded by [] Kandi Ramos PT        User Key  (r) = Recorded By, (t) = Taken By, (c) = Cosigned By    Initials Name Effective Dates     Kandi Ramos, PT 02/07/17 -                 IP PT Goals       01/23/18 1131 01/23/18 1130       Bed Mobility PT LTG    Bed Mobility PT LTG, Date Established  01/23/18  -LB     Bed Mobility PT LTG, Time to Achieve  1 wk  -LB     Bed Mobility PT LTG, [place] Level  contact guard assist;minimum assist (75% patient effort)  -LB      Transfer Training PT LTG    Transfer Training PT LTG, Time to Achieve  1 wk  -LB     Transfer Training PT LTG, [place] Level  contact guard assist;minimum assist (75% patient effort)  -LB     Transfer Training PT LTG, Assist Device  walker, rolling  -LB     Gait Training PT LTG    Gait Training Goal PT LTG, Date Established 01/23/18  -LB      Gait Training Goal PT LTG, Time to Achieve 1 wk  -LB      Gait Training Goal PT LTG, [place] Level contact guard assist;minimum assist (75% patient effort)  -LB      Gait Training Goal PT LTG, Assist Device walker, rolling  -LB      Gait Training Goal PT LTG, Distance to Achieve 50  -LB        User Key  (r) = Recorded By, (t) = Taken By, (c) = Cosigned By    Initials Name Provider Type    LB Kandi Clifford, PT Physical Therapist          Physical Therapy Education     Title: PT OT SLP Therapies (Done)     Topic: Physical Therapy (Done)     Point: Mobility training (Done)    Learning Progress Summary    Learner Readiness Method Response Comment Documented by Status   Patient Acceptance E VU,NR   01/24/18 1212 Done    Acceptance E NR   01/23/18 1127 Active                      User Key     Initials Effective Dates Name Provider Type Discipline     04/24/15 -  Kandi Clifford, PT Physical Therapist PT     02/07/17 -  Kandi Ramos, PT Physical Therapist PT                    PT Recommendation and Plan  Planned Therapy Interventions: bed mobility training, transfer training, gait training  PT Frequency: daily  Plan of Care Review  Plan Of Care Reviewed With: patient  Outcome Summary/Follow up Plan: pt req much encouragement/coaxing for PT participation in sitting EOB, req max assist to get EOB. will cont to progress mobility as sandrita and agreeable.           Outcome Measures       01/24/18 1200 01/23/18 0905       How much help from another person do you currently need...    Turning from your back to your side while in flat bed without using bedrails? 2  -LH 2  -LB      Moving from lying on back to sitting on the side of a flat bed without bedrails? 2  - 2  -LB     Moving to and from a bed to a chair (including a wheelchair)? 2  - 2  -LB     Standing up from a chair using your arms (e.g., wheelchair, bedside chair)? 1  - 2  -LB     Climbing 3-5 steps with a railing? 1  - 1  -LB     To walk in hospital room? 1  - 2  -LB     AM-PAC 6 Clicks Score 9  - 11  -LB     Functional Assessment    Outcome Measure Options AM-PAC 6 Clicks Basic Mobility (PT)  - AM-PAC 6 Clicks Basic Mobility (PT)  -       User Key  (r) = Recorded By, (t) = Taken By, (c) = Cosigned By    Initials Name Provider Type     Kandi Clifford, PT Physical Therapist     Kandi Ramos, PT Physical Therapist           Time Calculation:         PT Charges       01/24/18 1214          Time Calculation    Start Time 0950  -      Stop Time 1000  -      Time Calculation (min) 10 min  -      PT Received On 01/24/18  -      PT - Next Appointment 01/25/18  -        User Key  (r) = Recorded By, (t) = Taken By, (c) = Cosigned By    Initials Name Provider Type     Kandi Ramos, PT Physical Therapist          Therapy Charges for Today     Code Description Service Date Service Provider Modifiers Qty    [id number]  PT THER PROC EA 15 MIN 1/24/2018 Kandi Ramos, PT GP 1          PT G-Codes  Outcome Measure Options: AM-PAC 6 Clicks Basic Mobility (PT)    Kandi Ramos, PT  1/24/2018

## 2018-01-24 NOTE — PROGRESS NOTES
"Daily progress note    Chief complaint  Doing better  No new issues    History of present illness  88-year-old white female with history of chronic atrial fibrillation on anticoagulation other medical problem dementia brain tumor osteoarthritis osteoporosis who is a nursing home resident sent to the [facility] emergency room with mental status changes.  Patient is very confused top minimally complain of some chest pain shortness of breath.  Patient workup in ER revealed elevated troponin level consistent with non-ST elevation MI and UTI admitted for management.  No family member available.  Most of the history obtained from the chart and staff will record.  Per nursing staff no fever chills no cough congestion or nausea vomiting diarrhea.     REVIEW OF SYSTEMS  Review of Systems   Unable to perform ROS: Dementia   Cardiovascular: Positive for chest pain.      PHYSICAL EXAM  Blood pressure 106/71, pulse 65, temperature 97.9 °F (36.6 °C), temperature source Oral, resp. rate 16, height 157.5 cm (62.01\"), weight 75.7 kg (166 lb 12.8 oz), SpO2 91 %.    Constitutional: She is well-developed, well-nourished, and in no distress. She appears distressed.   Head: Normocephalic and atraumatic.   Eyes: EOM are normal. Pupils are equal, round, and reactive to light.   Neck: Normal range of motion. Neck supple.   Cardiovascular: Regular rhythm.  Bradycardia present.    Murmur (2/6 ejection) heard.  Pulmonary/Chest: She is in respiratory distress (mild). She has wheezes (mild expiratory ). She has rhonchi in the left lower field.   Abdominal: Soft. There is no tenderness. There is no rebound and no guarding.   Musculoskeletal: Normal range of motion. She exhibits no edema.   Neurological: She is alert. She has normal sensation and normal strength.   Skin: Skin is warm and dry. No rash noted.   Psychiatric: Mood and affect normal.     LAB RESULTS  Lab Results (last 24 hours)     Procedure Component Value Units Date/Time    CBC " & Differential [[id number]] Collected:  01/24/18 0429    Specimen:  Blood Updated:  01/24/18 0515    Narrative:       The following orders were created for panel order CBC & Differential.  Procedure                               Abnormality         Status                     ---------                               -----------         ------                     CBC Auto Differential[[id number]]        Abnormal            Final result                 Please view results for these tests on the individual orders.    CBC Auto Differential [[id number]]  (Abnormal) Collected:  01/24/18 0429    Specimen:  Blood Updated:  01/24/18 0515     WBC 4.76 10*3/mm3      RBC 4.08 10*6/mm3      Hemoglobin 12.8 g/dL      Hematocrit 38.0 %      MCV 93.1 fL      MCH 31.4 pg      MCHC 33.7 g/dL      RDW 12.9 %      RDW-SD 43.7 fl      MPV 10.1 fL      Platelets 127 (L) 10*3/mm3      Neutrophil % 78.2 (H) %      Lymphocyte % 11.3 (L) %      Monocyte % 9.0 %      Eosinophil % 1.3 %      Basophil % 0.2 %      Immature Grans % 0.0 %      Neutrophils, Absolute 3.72 10*3/mm3      Lymphocytes, Absolute 0.54 (L) 10*3/mm3      Monocytes, Absolute 0.43 10*3/mm3      Eosinophils, Absolute 0.06 10*3/mm3      Basophils, Absolute 0.01 10*3/mm3      Immature Grans, Absolute 0.00 10*3/mm3     Protime-INR [[id number]]  (Abnormal) Collected:  01/24/18 0429    Specimen:  Blood Updated:  01/24/18 0517     Protime 27.0 (H) Seconds      INR 2.60 (H)    Basic Metabolic Panel [[id number]]  (Abnormal) Collected:  01/24/18 0429    Specimen:  Blood Updated:  01/24/18 0531     Glucose 97 mg/dL      BUN 19 mg/dL      Creatinine 0.61 mg/dL      Sodium 143 mmol/L      Potassium 3.0 (L) mmol/L      Chloride 103 mmol/L      CO2 25.9 mmol/L      Calcium 9.1 mg/dL      eGFR Non African Amer 93 mL/min/1.73      BUN/Creatinine Ratio 31.1 (H)     Anion Gap 14.1 mmol/L     Narrative:       The MDRD GFR formula is only valid for adults with stable renal function between ages 18 and  70.    BNP [[id number]]  (Normal) Collected:  01/24/18 0429    Specimen:  Blood Updated:  01/24/18 0533     proBNP 1392.0 pg/mL     Narrative:       Among patients with dyspnea, NT-proBNP is highly sensitive for the detection of acute congestive heart failure. In addition NT-proBNP of <300 pg/ml effectively rules out acute congestive heart failure with 99% negative predictive value.    Blood Culture - Blood, [[id number]]  (Normal) Collected:  01/20/18 1036    Specimen:  Blood from Arm, Left Updated:  01/24/18 1046     Blood Culture No growth at 4 days    Blood Culture - Blood, [[id number]]  (Normal) Collected:  01/20/18 1138    Specimen:  Blood from Arm, Left Updated:  01/24/18 1201     Blood Culture No growth at 4 days        Imaging Results (last 24 hours)     ** No results found for the last 24 hours. **        EKG                                                  Rhythm/Rate: Afib/40  P waves and NM: Normal  QRS, axis: Q waves V1 and V2   ST and T waves: Nonspecific ST changes      Current Facility-Administered Medications:   •  aspirin chewable tablet 81 mg, 81 mg, Oral, Daily, Alvin Calderon MD, 81 mg at 01/24/18 0949  •  atorvastatin (LIPITOR) tablet 10 mg, 10 mg, Oral, Nightly, Alvin Calderon MD, 10 mg at 01/23/18 2105  •  cefTRIAXone (ROCEPHIN) IVPB 1 g, 1 g, Intravenous, Q24H, Alvin Calderon MD, Last Rate: 100 mL/hr at 01/23/18 1549, 1 g at 01/23/18 1549  •  Divalproex Sodium (DEPAKOTE SPRINKLE) capsule 125 mg, 125 mg, Oral, Q12H, Alvin Calderon MD, 125 mg at 01/24/18 0949  •  donepezil (ARICEPT) tablet 10 mg, 10 mg, Oral, Nightly, Alvin Calderon MD, 10 mg at 01/23/18 2106  •  furosemide (LASIX) tablet 20 mg, 20 mg, Oral, BID, Alvin Calderon MD, 20 mg at 01/24/18 0949  •  ipratropium-albuterol (DUO-NEB) nebulizer solution 3 mL, 3 mL, Nebulization, Q4H - RT, Alvin Calderon MD, 3 mL at 01/24/18 0718  •  isosorbide dinitrate (ISORDIL) tablet 5 mg, 5 mg, Oral, Q8H, Alvin Calderon MD, 5 mg at 01/24/18 0618  •  pantoprazole  (PROTONIX) EC tablet 40 mg, 40 mg, Oral, Q AM, Mayur Calderon MD, 40 mg at 01/24/18 0618  •  risperiDONE (risperDAL) tablet 0.25 mg, 0.25 mg, Oral, Daily, Mayur Calderon MD, 0.25 mg at 01/24/18 0949  •  Insert peripheral IV, , , Once **AND** sodium chloride 0.9 % flush 10 mL, 10 mL, Intravenous, PRN, Juma Diaz MD  •  warfarin (COUMADIN) tablet 1 mg, 1 mg, Oral, Daily, Mayur Calderon MD, 1 mg at 01/23/18 4316     ASSESSMENT  Acute UTI  Chest pain with elevated troponin   Dementia  Chronic atrial fibrillation 90 coagulation with therapeutic INR  Acute  on chronic diastolic CHF  Hyperlipidemia  Gastroesophageal reflux disease  DNR    PLAN  Discharge back to nursing home  Discharge summary dictated    MAYUR CALDERON MD

## 2018-01-24 NOTE — PLAN OF CARE
Problem: Patient Care Overview (Adult)  Goal: Plan of Care Review   01/24/18 1212   Coping/Psychosocial Response Interventions   Plan Of Care Reviewed With patient   Outcome Evaluation   Outcome Summary/Follow up Plan pt req much encouragement/coaxing for PT participation in sitting EOB, req max assist to get EOB. will cont to progress mobility as sandrita and agreeable.

## 2018-01-24 NOTE — PLAN OF CARE
Problem: Patient Care Overview (Adult)  Goal: Plan of Care Review  Outcome: Ongoing (interventions implemented as appropriate)   01/24/18 0452   Coping/Psychosocial Response Interventions   Plan Of Care Reviewed With patient   Patient Care Overview   Progress no change   Outcome Evaluation   Outcome Summary/Follow up Plan VSS. No change throughout the shift, pt still does not want to advance mobility. Still tolerating NTL and pureed diet with no s/s aspiration. Dysphagia restrictions remain in place. Purewick still in place with drainage to wall suction. Layers limited, skin intact. Will continue to monitor. Should d/c soon.

## 2018-01-24 NOTE — THERAPY DISCHARGE NOTE
"Acute Care - Speech Language Pathology   Swallow Treatment Note/Discharge   [facility]     Patient Name: Angie Mckeon  : 1929  MRN: [id number]  Today's Date: 2018  Onset of Illness/Injury or Date of Surgery Date: 18            Admit Date: 2018     SLP attempted to reeval pt's swallow; however, pt d/c to NH per unit secretary.      Visit Dx:      ICD-10-CM ICD-9-CM   1. Non-STEMI (non-ST elevated myocardial infarction) I21.4 410.70   2. Muscle weakness (generalized) M62.81 728.87     Patient Active Problem List   Diagnosis   • Permanent atrial fibrillation   • Dementia   • Elevated troponin             Adult Rehabilitation Note       18 1600 18 1200       Rehab Assessment/Intervention    Discipline speech language pathologist  - physical therapist  -     Document Type discharge summary  - therapy note (daily note)  -     Subjective Information  agree to therapy  -     Patient Effort, Rehab Treatment  good  -     Treatment Not Performed patient unavailable for treatment   Pt gone back to NH per unit secretary  -      Patient Response to Treatment  pt resistant to PT, \" i dont think i want to do this.\" much encouragement to sit EOB  -LH     Recorded by [] Sosa Madden, MS CCC-SLP [] Kandi Ramos, PT     Pain Assessment    Pain Assessment  No/denies pain  -LH     Recorded by  [] Kandi Ramos, PT     Vision Assessment/Intervention    Visual Impairment  WFL  -LH     Recorded by  [] Kandi Ramos, PT     Cognitive Assessment/Intervention    Current Cognitive/Communication Assessment  impaired  -     Orientation Status  oriented to;person;place  -     Follows Commands/Answers Questions  75% of the time;50% of the time;able to follow single-step instructions;needs cueing;needs increased time;needs repetition  -     Personal Safety  decreased insight to deficits  -     Personal Safety Interventions  fall prevention program maintained;gait belt;nonskid " shoes/slippers when out of bed;supervised activity  -     Recorded by  [] Kandi Ramos PT     Bed Mobility, Assessment/Treatment    Bed Mobility, Assistive Device  bed rails;draw sheet  -     Bed Mob, Supine to Sit, [place]  maximum assist (25% patient effort);verbal cues required;nonverbal cues required (demo/gesture)  -     Bed Mob, Sit to Supine, [place]  maximum assist (25% patient effort);verbal cues required;nonverbal cues required (demo/gesture)  -     Bed Mobility, Safety Issues  cognitive deficits limit understanding  -     Bed Mobility, Impairments  strength decreased;impaired balance  -LH     Recorded by  [] Kandi Ramos, PT     Transfer Assessment/Treatment    Transfer, Comment  refusd  -LH     Recorded by  [] Kandi Ramos PT     Gait Assessment/Treatment    Gait, [place] Level  not tested  -LH     Recorded by  [] Kandi Ramos PT     Motor Skills/Interventions    Additional Documentation  Balance Skills Training (Group)  -LH     Recorded by  [] Kandi Ramos PT     Balance Skills Training    Sitting-Level of Assistance  Moderate assistance  -     Sitting-Balance Support  Left upper extremity supported;Right upper extremity supported  -     Sitting-Balance Activities  Trunk control activities  -     Sitting # of Minutes  30 sec, much encouragement req to attempt sitting EOB  -     Recorded by  [] Kandi Ramos PT     Therapy Exercises    Bilateral Lower Extremities  AROM:;5 reps;supine;ankle pumps/circles  -     Recorded by  [] Kandi Ramos PT     Positioning and Restraints    Pre-Treatment Position  in bed  -     Post Treatment Position  bed  -     In Bed  supine;call light within reach;encouraged to call for assist;exit alarm on;SCD pump applied  -LH     Recorded by  [] Kandi Ramos PT       User Key  (r) = Recorded By, (t) = Taken By, (c) = Cosigned By    Initials Name Effective Dates     Kandi Ramos PT 02/07/17 -     SA Sosa Madden MS  CCC-SLP 07/25/17 -                SLP Goals       01/24/18 1617 01/21/18 1615       Safely Consume Diet    Safely Consume Diet- SLP, Date Established  01/21/18  -SA     Safely Consume Diet- SLP, Time to Achieve  by discharge  -SA     Safely Consume Diet- SLP, Outcome goal met  -SA      Safely Consume Diet- SLP, Reason Goal Not Met discharged from facility  -SA        User Key  (r) = Recorded By, (t) = Taken By, (c) = Cosigned By    Initials Name Provider Type    SA Sosa Madden MS CCC-SLP Speech and Language Pathologist          EDUCATION  The patient has been educated in the following areas:   Dysphagia (Swallowing Impairment) Modified Diet Instruction.    SLP Recommendation and Plan   Pt to continue pureed/ nectar thick liquids   Swallow tx for dysphagia as pt can tolerate upon d/c.                     Anticipated Discharge Disposition: placement for care                               Time Calculation:                SLP Discharge Summary  Anticipated Discharge Disposition: placement for care  Reason for Discharge: Discharge from facility  Outcomes Achieved: Unable to make functional progress toward goals at this time    Sosa Madden MS CCC-SLP  1/24/2018

## 2018-01-24 NOTE — PLAN OF CARE
Problem: Inpatient SLP  Goal: Dysphagia- Patient will safely consume diet as per recommendation with no signs/symptoms of aspiration  Outcome: Ongoing (interventions implemented as appropriate)   01/24/18 1617   Safely Consume Diet   Safely Consume Diet- SLP, Outcome goal met   Safely Consume Diet- SLP, Reason Goal Not Met discharged from facility

## 2018-01-24 NOTE — DISCHARGE SUMMARY
Discharge summary    Date of admission 1/20/2018  Date of discharge 1/24/2018    Final diagnosis  Acute UTI with cultures negative completed 5 days of antibiotics  Chest pain with elevated troponin medical treatment  Dementia  Chronic atrial fibrillation 90 coagulation with therapeutic INR  Acute  on chronic diastolic CHF  Hyperlipidemia  Gastroesophageal reflux disease  DNR    Discharge medications    Current Facility-Administered Medications:   •  aspirin chewable tablet 81 mg, 81 mg, Oral, Daily, Alvin Calderon MD, 81 mg at 01/24/18 0949  •  atorvastatin (LIPITOR) tablet 10 mg, 10 mg, Oral, Nightly, Alvin Calderon MD, 10 mg at 01/23/18 2105  •  Divalproex Sodium (DEPAKOTE SPRINKLE) capsule 125 mg, 125 mg, Oral, Q12H, Alvin Calderon MD, 125 mg at 01/24/18 0949  •  donepezil (ARICEPT) tablet 10 mg, 10 mg, Oral, Nightly, Alvin Calderon MD, 10 mg at 01/23/18 2106  •  furosemide (LASIX) tablet 20 mg, 20 mg, Oral, BID, Alvin Calderon MD, 20 mg at 01/24/18 0949  •  isosorbide dinitrate (ISORDIL) tablet 5 mg, 5 mg, Oral, Q8H, Alvin Calderon MD, 5 mg at 01/24/18 0618  •  pantoprazole (PROTONIX) EC tablet 40 mg, 40 mg, Oral, Q AM, Alvin Calderon MD, 40 mg at 01/24/18 0618  •  potassium chloride (KLOR-CON) packet 20 mEq, 20 mEq, Oral, BID, Alvin Calderon MD  •  risperiDONE (risperDAL) tablet 0.25 mg, 0.25 mg, Oral, Daily, Alvin Calderon MD, 0.25 mg at 01/24/18 0949  •  Insert peripheral IV, , , Once **AND** sodium chloride 0.9 % flush 10 mL, 10 mL, Intravenous, PRN, Juma Diaz MD  •  warfarin (COUMADIN) tablet 1 mg, 1 mg, Oral, Daily, Alvin Calderon MD, 1 mg at 01/23/18 1824     Consult obtained  Cardiology    Procedures  None  2-D echo obtained which showed ejection fraction 62%    Hospital course  88-year-old white female with dementia chronic atrial fibrillation hypertension hyperlipidemia gastroesophageal reflux disease admitted through emergency room with altered mental status chest pain shortness of breath.  Patient found to have  UTI and admitted for management.  Patient also have slightly elevated troponin level which cardiology recommended medical treatment and no further workup.  Patient is pain-free now.  Patient cultures are negative and her antibiotics stopped.  Patient remained on anticoagulation with INR therapeutic.  Patient diuretics also adjusted and she is euvolemic now.  Patient to discharge back to nursing home and she remained DNR throughout hospital course.    Discharge diet puréed nectar thick    Activity per PT OT    Medication as above    Further care per accepting physician at nursing home    MAYUR IBARRA MD

## 2018-01-24 NOTE — PROGRESS NOTES
Continued Stay Note  [facility]     Patient Name: Angie Mckeon  MRN: [id number]  Today's Date: 1/24/2018    Admit Date: 1/20/2018          Discharge Plan       01/24/18 0934    Case Management/Social Work Plan    Additional Comments Spoke with Ms Reid- [phone] . She wants CCP to speak with  patient's daughter, Julianna Walker who lives in [place]. Spoke with Ms Walker and she and Ms Reid have  discussed. Both  agree with placement  at [facility]. Asked them to contact facility to discuss.......  FAITH Cabrera      01/24/18 0903    Case Management/Social Work Plan    Plan Per Macario, they can accept patient at [facility] and are trying to contact family to discuss    Additional Comments Spoke with macario this AM. They can accept patient but  will need to talk with family before arrangements are completed. Voice messages left for sister, Ashley Reid. requesting a return call . Per Macario, they need to speak with family before  patient can be transferred. . Also [facility] has a patient discharging today  and are not sure what time  patient could transfer to them. CCP to follow              Discharge Codes     None        Expected Discharge Date and Time     Expected Discharge Date Expected Discharge Time    Jan 24, 2018             FAITH Cabrera

## 2018-01-24 NOTE — PROGRESS NOTES
Case Management Discharge Note    Final Note: Pt d/c to [facility] today for SNF.  Pt transported [facility].    Discharge Placement     Facility/Agency Request Status Selected? Address Phone Number Fax Number    [facility] Accept[facility]    Yes [address], [facility] [address] [phone]        FAITH Caberra 1/23/2018 14:02    Patient ready for discharge and can not return to [facility]               [facility] Declined   can not accept patient with pureed diet     [address] , [facility] [address] [phone]    [facility] Declined     [address], [facility] [address] [phone]        FAITH Cabrera 1/23/2018 13:38    Patient is ready for discharge. From [facility], can not return               [facility] Declined     [address] , [facility] [address] [phone]        FAITH Cabrera 1/23/2018 13:39    Patient can not return to [facility] and is ready for discharge               [facility] Declined   can not accept medicaid     [address], [facility] [address] [phone]        FAITH Cabrera 1/23/2018 13:40    Patient can not return to [facility] and is ready for discharge               [facility] Declined   family can not afford to pay privately     [address], [facility] [address] [phone]        FAITH Cabrera 1/23/2018 14:03    Patient ready for discharge and [facility] can not accept                   [facility]: [facility]    Discharge Codes: 03  Discharged/transferred to skilled nursing facility (SNF) with Medicare certification in anticipation of skilled care

## 2018-01-27 PROBLEM — R06.03 RESPIRATORY DISTRESS, ACUTE: Status: ACTIVE | Noted: 2018-01-01

## 2018-02-01 NOTE — SIGNIFICANT NOTE
02/01/18 0912   Rehab Treatment   Discipline occupational therapist   Rehab Evaluation   Evaluation Not Performed (Discuss with RN.  Pt from ECF, poor arousal with physical therapy yesterday, now palliative consult.  Skilled acute care OT not indicated. RN agree with OT signing off. )      cognition/pain/decreased strength/impaired balance

## 2018-02-01 NOTE — SIGNIFICANT NOTE
02/01/18 0818   Rehab Treatment   Discipline physical therapy assistant   Treatment Not Performed unable to treat, medical status change  (No per RN Isis. Pt w/ palliative consult and family to meet w/ palliative nurse today. Will follow-up tomorrow.)   Recommendation   PT - Next Appointment 02/02/18

## 2018-02-01 NOTE — CONSULTS
Purpose of the visit was to evaluate for: goals of care/advanced care planning, support for patient/family, hospice referral/discussion, transfer to comfort care bed/unit and comfort care. Spoke with RN as well as family and discussed palliative care, goals of care, care options, resuscitation status, [facility] bed status.      Assessment:  Patient is palliative care appropriate for inpatient care given respiratory distress, bradycardia, recent MI, HF,chronic afib, brain tumor, dementia. PPS 30%. Answers some questions appropriately, but has dementia.      Recommendations/Plan: transfer to [facility] for palliative care and [facility] evaluation for community based services. Change in Code Status to DNR/DNI. To continue her home meds and oxygen. Wants to meet with [facility] about scattered bed either here or at [facility].     Other Comments: Met with dgt, Julianna Walker. Patient has been in [facility]. They thought she was in Palliative Care while she was there and was wondering why she was sent back to hospital.  She understands that in light of her recent heart attack that her heart is much weaker, does not qualify for a pacemaker. Julianan and the 2 sisters of Angie Mckeon all make decisions for her.   Information given about Palliative Care. Will move patient after Dr. Calderon sees this morning.    Thank you for the referral.

## 2018-02-01 NOTE — SIGNIFICANT NOTE
RN cancelled V[facility]SS as patient is going to be receiving Palliative care.  Family aware prognosis is poor and death is eminent.  Family desires to keep patient comfortable.  Pt currently on a puree diet with honey thick liquids. RN suggested talking with family regarding diet per quality of life.      02/01/18 1123   Rehab Treatment   Discipline speech language pathologist

## 2018-02-01 NOTE — PLAN OF CARE
Problem: Inpatient SLP  Goal: Dysphagia- Patient will safely consume diet as per recommendation with no signs/symptoms of aspiration   02/01/18 1338   Safely Consume Diet   Safely Consume Diet- SLP, Time to Achieve by discharge   Safely Consume Diet- SLP, Activity Level Patient will improve laryngeal elevation for safer, more efficient swallow

## 2018-02-01 NOTE — THERAPY RE-EVALUATION
Acute Care - Speech Language Pathology   Swallow Re-Evaluation [facility]     Patient Name: Angie Mckeon  : 1929  MRN: [id number]  Today's Date: 2018  Onset of Illness/Injury or Date of Surgery Date: 18            Admit Date: 2018    Visit Dx:     ICD-10-CM ICD-9-CM   1. Respiratory distress, acute R06.03 518.82   2. Hypoxia R09.02 799.02   3. Chronic systolic congestive heart failure I50.22 428.22     428.0   4. Chronic a-fib I48.2 427.31   5. Muscle weakness (generalized) M62.81 728.87     Patient Active Problem List   Diagnosis   • Permanent atrial fibrillation   • Dementia   • Elevated troponin   • Respiratory distress, acute     Past Medical History:   Diagnosis Date   • Dementia    • History of brain tumor     X2   • Hyperlipidemia    • Hypertension    • Osteoporosis    • Permanent atrial fibrillation    • Sleep apnea    • Stroke    • Tremor    • Valvular heart disease     s/p mechanical AVR/MVR     Past Surgical History:   Procedure Laterality Date   • CARDIAC VALVE REPLACEMENT     • HYSTERECTOMY     • JOINT REPLACEMENT      SULTANA. HIP       SPEECH-LANGUAGE PATHOLOGY EVALUATION - SWALLOW  Subjective: The patient was seen on this date for a Clinical Swallow evaluation.  RN cancelled VFSS due to Palliative Care orders. Pt seen at bedside. Pt initially asleep, but able to wake up with verbal/tactile cues.  Daughter present.  Discussed options regarding PO including diet for quality of life.  Family desires the least restrictive oral diet with the least risk of aspiration.  Family states pt doesn't like the puree food and feel food with more texture would increase her quality of life.  Discussed in the future pt may cough/choke more with thin liquids and it may be more difficulty for pt to chew.  Discussed that increased coughing/choking with PO may negatively impact the quality of life as well. Also discussed it may take too much energy for the patient to chew and puree foods may be  more desirable.   Objective: Textures given included ice, thin liquid, nectar thick liquid, honey thick liquid, puree consistency, mechanical soft consistency and regular consistency.  Assessment:   Pt initially sleepy and required verbal/tactile cues to initiate a swallow.  Pt became more responsive and awake in the early portion of the evaluation and less cues were needed.  Intermittent coughing/choking noted with thin liquids when presented to pt via cup.  Feel pt had less control over the liquids as SLP was administering the cups.  No overt s/s of aspiration with straw sips of thin liquids when the patient could control the liquid.  No overt s/s of aspiration noted with cup or straw sips of nectar or honey thick liquids, puree, mechanical soft or solid texture.  No oral residuals noted.  Swallows were audible at times, but dry.  No wetness or gurgly vocal quality noted.    SLP Findings:  Patient presents with mild to moderate oropharyngeal dysphagia, with esophageal component. Some belching noted.   Recommendations: Diet Textures: thin liquid, mechanical soft consistency food with chopped foods.   Medications should be taken whole or crushed with puree, thin liquids or nectar thick.  Recommended Strategies: Upright for PO, small bites and sips, may use straw and supervision with all PO. Oral care before breakfast, after all meals and PRN. Pt did better with thin liquids via straw.   Other Recommended Evaluations: Monitor swallow at bedside.   Dysphagia therapy is recommended. Rationale: Safety on least restrictive oral diet as well as monitoring for diet per quality of life.         SWALLOW EVALUATION (last 72 hours)      Swallow Evaluation       02/01/18 1200                Rehab Evaluation    Document Type re-evaluation   RN cancelled VFSS scheduled for 1100 this date  -NR        Subjective Information agree to therapy  -NR        Patient Effort, Rehab Treatment adequate  -NR        Symptoms Noted During/After  Treatment fatigue  -NR        General Information    Patient Profile Review yes  -NR        Prior Level of Function- Swallowing safe, efficient swallowing in all situations   family reports regular foods and liqiuds at NH  -NR        Plans/Goals Discussed With patient and family  -NR        Barriers to Rehab cognitive status  -NR        Clinical Impression    Patient's Goals For Discharge patient did not state  -NR        Family Goals For Discharge patient able to eat/drink without coughing/choking  -NR        Rehab Potential/Prognosis, Swallowing fair, will monitor progress closely  -NR        Criteria for Skilled Therapeutic Interventions Met demonstrates skilled criteria for intervention  -NR        Therapy Frequency PRN  -NR        Predicted Duration Therapy Interv (days) until discharge  -NR        Pain Assessment    Pain Assessment No/denies pain  -NR        Cognitive Assessment/Intervention    Current Cognitive/Communication Assessment impaired  -NR        Oral Motor Structure and Function    Oral Musculature General Assessment --   Suspect generalized weakness  -NR        Dysphagia Treatment Objectives and Progress    Dysphagia Treatment Objectives Other 1;Other 2  -NR        Dysphagia Other 1    Dysphagia Other 1 Objective --   Tolerate diet without overt s/s of aspiration.  -NR        Status: Dysphagia Other 1 New  -NR        Dysphagia Other 2    Dysphagia Other 2 Objective --   Downgrade to NTL if increased coughing/choking with thins  -NR          User Key  (r) = Recorded By, (t) = Taken By, (c) = Cosigned By    Initials Name Effective Dates    NR Safia Forman MA,CCC-SLP 04/13/15 -         EDUCATION  The patient has been educated in the following areas:   Dysphagia (Swallowing Impairment).    SLP Recommendation and Plan                    Criteria for Skilled Therapeutic Interventions Met: demonstrates skilled criteria for intervention     Rehab Potential/Prognosis, Swallowing: fair, will monitor  progress closely  Therapy Frequency: PRN                        IP SLP Goals       02/01/18 1338 01/29/18 1211       Safely Consume Diet    Safely Consume Diet- SLP, Date Established  01/29/18  -SH     Safely Consume Diet- SLP, Time to Achieve by discharge  -NR by discharge  -SH     Safely Consume Diet- SLP, Activity Level Patient will improve laryngeal elevation for safer, more efficient swallow  -NR        User Key  (r) = Recorded By, (t) = Taken By, (c) = Cosigned By    Initials Name Provider Type    NR Safia Forman MA,[facility]-SLP Speech and Language Pathologist     Maite Mercado, MS CCC-SLP Speech and Language Pathologist             SLP Outcome Measures (last 72 hours)      SLP Outcome Measures       02/01/18 1200          SLP Outcome Measures    Outcome Measure Used? Adult NOMS  -NR      FCM Scores    Swallowing FCM Score 5  -NR        User Key  (r) = Recorded By, (t) = Taken By, (c) = Cosigned By    Initials Name Effective Dates    NR Safia Forman MA,[facility]-SLP 04/13/15 -            Time Calculation:         Time Calculation- SLP       02/01/18 1345          Time Calculation- SLP    SLP Start Time 1115  -NR      SLP Stop Time 1200  -NR      SLP Time Calculation (min) 45 min  -NR      SLP Received On 02/01/18  -NR        User Key  (r) = Recorded By, (t) = Taken By, (c) = Cosigned By    Initials Name Provider Type    NR Safia Forman MA,[facility]-SLP Speech and Language Pathologist          Therapy Charges for Today     Code Description Service Date Service Provider Modifiers Qty    32792331121 HC ST SWALLOWING CURRENT STATUS 2/1/2018 Safia Forman MA,CCC-SLP GN, CJ 1    29337548083 HC ST SWALLOWING PROJECTED 2/1/2018 Safia Forman MA,CCC-SLP GN, CI 1    99291038796 HC ST SWALLOWING DISCHARGE 2/1/2018 Safia Forman MA,CCC-SLP GN, CJ 1    26713789511 HC ST TREATMENT SWALLOW 3 2/1/2018 Safia Forman MA,[facility]-SLP GN 1          SLP G-Codes  SLP NOMS Used?: Yes  Functional Limitations: Swallowing  Swallow Current Status  (): At least 20 percent but less than 40 percent impaired, limited or restricted  Swallow Goal Status (): At least 1 percent but less than 20 percent impaired, limited or restricted  Swallow Discharge Status (): At least 20 percent but less than 40 percent impaired, limited or restricted    Safia Forman MA,CCC-SLP  2/1/2018

## 2018-02-01 NOTE — PLAN OF CARE
Problem: Patient Care Overview (Adult)  Goal: Plan of Care Review  Outcome: Ongoing (interventions implemented as appropriate)   02/01/18 8485   Coping/Psychosocial Response Interventions   Plan Of Care Reviewed With patient;family   Patient Care Overview   Progress no change   Outcome Evaluation   Outcome Summary/Follow up Plan Pt transferred from  today to be made palliative. Pt appears comfortable at this time with no indicators of pain. Will continue to monitor per comfort care.      Goal: Adult Individualization and Mutuality  Outcome: Ongoing (interventions implemented as appropriate)    Goal: Discharge Needs Assessment  Outcome: Ongoing (interventions implemented as appropriate)      Problem: Fall Risk (Adult)  Goal: Absence of Falls  Outcome: Ongoing (interventions implemented as appropriate)      Problem: Pressure Ulcer Risk (Joe Scale) (Adult,Obstetrics,Pediatric)  Goal: Skin Integrity  Outcome: Ongoing (interventions implemented as appropriate)      Problem: Confusion, Chronic (Adult)  Goal: Cognitive/Functional Impairments Minimized  Outcome: Ongoing (interventions implemented as appropriate)    Goal: Free from Harm/Injuries  Outcome: Ongoing (interventions implemented as appropriate)      Problem: Dying Patient, Actively (Adult)  Goal: Identify Related Risk Factors and Signs and Symptoms  Outcome: Ongoing (interventions implemented as appropriate)    Goal: Comfort/Pain Control  Outcome: Ongoing (interventions implemented as appropriate)    Goal: Dying Process, Peace and Dignity  Outcome: Ongoing (interventions implemented as appropriate)

## 2018-02-01 NOTE — PLAN OF CARE
Problem: Patient Care Overview (Adult)  Goal: Plan of Care Review  Outcome: Ongoing (interventions implemented as appropriate)   02/01/18 0644   Coping/Psychosocial Response Interventions   Plan Of Care Reviewed With patient   Patient Care Overview   Progress no change   Outcome Evaluation   Outcome Summary/Follow up Plan VSS. RESTED WELL IN BETWEEN CARE/TURNS WITHOUT C/O'S. SAFETY MAINTAINED.       Problem: Fall Risk (Adult)  Goal: Absence of Falls  Outcome: Ongoing (interventions implemented as appropriate)      Problem: Pressure Ulcer Risk (Joe Scale) (Adult,Obstetrics,Pediatric)  Goal: Skin Integrity  Outcome: Ongoing (interventions implemented as appropriate)      Problem: Confusion, Chronic (Adult)  Goal: Cognitive/Functional Impairments Minimized  Outcome: Ongoing (interventions implemented as appropriate)

## 2018-02-02 NOTE — PLAN OF CARE
Problem: Patient Care Overview (Adult)  Goal: Plan of Care Review  Outcome: Ongoing (interventions implemented as appropriate)   02/02/18 1701   Coping/Psychosocial Response Interventions   Plan Of Care Reviewed With patient;family   Patient Care Overview   Progress declining   Outcome Evaluation   Outcome Summary/Follow up Plan Pt medicated x1 for pain and restlessness. FC placed. Pt appears comfortable at this time. Will continue to monitor per comfort care.      Goal: Adult Individualization and Mutuality  Outcome: Ongoing (interventions implemented as appropriate)    Goal: Discharge Needs Assessment  Outcome: Ongoing (interventions implemented as appropriate)      Problem: Fall Risk (Adult)  Goal: Absence of Falls  Outcome: Ongoing (interventions implemented as appropriate)      Problem: Pressure Ulcer Risk (Joe Scale) (Adult,Obstetrics,Pediatric)  Goal: Skin Integrity  Outcome: Ongoing (interventions implemented as appropriate)      Problem: Dying Patient, Actively (Adult)  Goal: Identify Related Risk Factors and Signs and Symptoms  Outcome: Ongoing (interventions implemented as appropriate)    Goal: Comfort/Pain Control  Outcome: Ongoing (interventions implemented as appropriate)    Goal: Dying Process, Peace and Dignity  Outcome: Ongoing (interventions implemented as appropriate)

## 2018-02-02 NOTE — PROGRESS NOTES
"Daily progress note        Patient Identification:  Name: Angie Mckeon  Age: 88 y.o.  Sex: female  :  1929  MRN: [id number]                     Primary Care Physician: Jenelle Cabrera DO    Chief Complaint:    Comfortable  No new issues  Family at bedside    History of Present Illness:   88-year-old white female with history of chronic atrial fibrillation on anticoagulation other medical problem dementia brain tumor osteoarthritis osteoporosis who is a nursing home resident sent to the [facility] emergency room on 18after being noted to have acute onset SOA and gurgling white frothy sputum. Pt placed on bipap by EMS en route to ED. Pt is a DNR.  Patient was very confused and unable to provide any further info. Had recent hospitalization here for MI      Review of Systems  See history of present illness and past medical history.  Patient unable to provide any meaningful answers    Objective:  /75 (BP Location: Left arm, Patient Position: Lying)  Pulse (!) 47  Temp 97.2 °F (36.2 °C) (Oral)   Resp 12  Ht 165.1 cm (65\")  Wt 70.4 kg (155 lb 3.2 oz)  SpO2 100%  BMI 25.83 kg/m2    Exam unchanged  .    Data Review:  No results found for: WBC, HGB, HCT, PLT  Lab Results   Component Value Date     (H) 2018    K 4.2 2018     (H) 2018    CO2 25.2 2018    BUN 21 2018    CREATININE 0.64 2018    GLUCOSE 103 (H) 2018     Lab Results   Component Value Date    CALCIUM 9.2 2018     No results found for: AST, ALT, ALKPHOS  Lab Results   Component Value Date    INR 2.17 (H) 2018         Results from last 7 days  Lab Units 18  0419   TSH mIU/mL 2.870       Results from last 7 days  Lab Units 18  0419   HEMOGLOBIN A1C % 4.78*      Imaging Results (all)     Procedure Component Value Units Date/Time    XR Chest 1 View [[id number]] Collected:  18     Updated:  18    Narrative:       PORTABLE CHEST X-RAY   "   HISTORY: soa     COMPARISON: 01/20/2018.     FINDINGS: Portable AP view of the chest was obtained. Lungs are under  aerated but grossly  clear where visualized. Stable cardiomegaly.  Vascularity is normal.   No obvious significant pleural fluid  collections.           Impression:          Poor inspiration without active disease identified,     This report was finalized on 1/27/2018 7:30 PM by Hernandez Bañuelos MD.             Current Facility-Administered Medications:   •  acetaminophen (TYLENOL) tablet 650 mg, 650 mg, Oral, Q4H PRN **OR** acetaminophen (TYLENOL) 160 MG/5ML solution 650 mg, 650 mg, Oral, Q4H PRN **OR** acetaminophen (TYLENOL) suppository 650 mg, 650 mg, Rectal, Q4H PRN, Alvin Calderon MD  •  diphenoxylate-atropine (LOMOTIL) 2.5-0.025 MG per tablet 1 tablet, 1 tablet, Oral, Q2H PRN, Alvin Calderon MD  •  Glycerin-Hypromellose- (ARTIFICIAL TEARS) 0.2-0.2-1 % ophthalmic solution solution 1 drop, 1 drop, Both Eyes, Q30 Min PRN, Alvin Calderon MD  •  glycopyrrolate (ROBINUL) injection 0.2 mg, 0.2 mg, Intravenous, Q2H PRN **OR** glycopyrrolate (ROBINUL) injection 0.2 mg, 0.2 mg, Subcutaneous, Q2H PRN **OR** glycopyrrolate (ROBINUL) injection 0.4 mg, 0.4 mg, Intravenous, Q2H PRN **OR** glycopyrrolate (ROBINUL) injection 0.4 mg, 0.4 mg, Subcutaneous, Q2H PRN, Alvin Calderon MD  •  LORazepam (ATIVAN) tablet 0.5 mg, 0.5 mg, Oral, Q1H PRN **OR** LORazepam (ATIVAN) injection 0.5 mg, 0.5 mg, Intravenous, Q1H PRN, 0.5 mg at 02/02/18 0942 **OR** LORazepam (ATIVAN) injection 0.5 mg, 0.5 mg, Intramuscular, Q1H PRN **OR** LORazepam (ATIVAN) 2 MG/ML concentrated solution 0.5 mg, 0.5 mg, Oral, Q1H PRN **OR** LORazepam (ATIVAN) 2 MG/ML concentrated solution 0.5 mg, 0.5 mg, Sublingual, Q1H PRN, Alvin Calderon MD  •  LORazepam (ATIVAN) tablet 1 mg, 1 mg, Oral, Q1H PRN **OR** LORazepam (ATIVAN) injection 1 mg, 1 mg, Intravenous, Q1H PRN **OR** LORazepam (ATIVAN) injection 1 mg, 1 mg, Intramuscular, Q1H PRN **OR** LORazepam  (ATIVAN) 2 MG/ML concentrated solution 1 mg, 1 mg, Oral, Q1H PRN **OR** LORazepam (ATIVAN) 2 MG/ML concentrated solution 1 mg, 1 mg, Sublingual, Q1H PRN, Alvin Calderon MD  •  LORazepam (ATIVAN) tablet 2 mg, 2 mg, Oral, Q1H PRN **OR** LORazepam (ATIVAN) injection 2 mg, 2 mg, Intravenous, Q1H PRN **OR** LORazepam (ATIVAN) injection 2 mg, 2 mg, Intramuscular, Q1H PRN **OR** LORazepam (ATIVAN) 2 MG/ML concentrated solution 2 mg, 2 mg, Oral, Q1H PRN **OR** LORazepam (ATIVAN) 2 MG/ML concentrated solution 2 mg, 2 mg, Sublingual, Q1H PRN, Alvin Calderon MD  •  magic mouthwash oral supsension 5 mL, 5 mL, Swish & Spit, Q4H PRN, Alvin Calderon MD  •  morphine injection 2 mg, 2 mg, Intravenous, Q1H PRN, 2 mg at 02/02/18 0942 **OR** morphine concentrated solution solution 10 mg, 10 mg, Oral, Q1H PRN, Alvin Calderon MD  •  promethazine (PHENERGAN) injection 6.25 mg, 6.25 mg, Intravenous, Q4H PRN **OR** promethazine (PHENERGAN) tablet 6.25 mg, 6.25 mg, Oral, Q4H PRN **OR** promethazine (PHENERGAN) 6.25 MG/5ML syrup 6.25 mg, 6.25 mg, Oral, Q4H PRN **OR** promethazine (PHENERGAN) suppository 6.25 mg, 6.25 mg, Rectal, Q4H PRN, Alvin Calderon MD     Assessment:    Respiratory distress, acute    Bradycardia asymptomatic no need for pacemaker    Acute CHF    S/p recent MI     Chronic AFib    Dementia         Plan:  Comfort Care only  Allow natural death  Routine palliative care orders  Discussed with family    Alivn Calderon MD  2/2/2018  12:47 PM

## 2018-02-02 NOTE — PLAN OF CARE
Problem: Patient Care Overview (Adult)  Goal: Plan of Care Review  Outcome: Ongoing (interventions implemented as appropriate)   02/02/18 0419   Coping/Psychosocial Response Interventions   Plan Of Care Reviewed With patient   Patient Care Overview   Progress no change   Outcome Evaluation   Outcome Summary/Follow up Plan pre med for turns, alert to self, incont urine, drinking water and ate ice cream     Goal: Adult Individualization and Mutuality  Outcome: Ongoing (interventions implemented as appropriate)    Goal: Discharge Needs Assessment  Outcome: Ongoing (interventions implemented as appropriate)      Problem: Fall Risk (Adult)  Goal: Absence of Falls  Outcome: Ongoing (interventions implemented as appropriate)      Problem: Pressure Ulcer Risk (Joe Scale) (Adult,Obstetrics,Pediatric)  Goal: Skin Integrity  Outcome: Ongoing (interventions implemented as appropriate)      Problem: Confusion, Chronic (Adult)  Goal: Cognitive/Functional Impairments Minimized  Outcome: Ongoing (interventions implemented as appropriate)    Goal: Free from Harm/Injuries  Outcome: Ongoing (interventions implemented as appropriate)      Problem: Dying Patient, Actively (Adult)  Goal: Identify Related Risk Factors and Signs and Symptoms  Outcome: Ongoing (interventions implemented as appropriate)    Goal: Comfort/Pain Control  Outcome: Ongoing (interventions implemented as appropriate)    Goal: Dying Process, Peace and Dignity  Outcome: Ongoing (interventions implemented as appropriate)

## 2018-02-02 NOTE — PROGRESS NOTES
Continued Stay Note  [facility]     Patient Name: Angie Mckeon  MRN: [id number]  Today's Date: 2/2/2018    Admit Date: 1/27/2018          Discharge Plan       02/02/18 1759    Case Management/Social Work Plan    Additional Comments The patient transferred to [facility] from [address] [place] [address] on 2/1/18 @ 17:34. The patient is palliative. No [facility] order at this time. ALETA Hughes RN, CCP              Discharge Codes     None            Julianna Hughes RN

## 2018-02-03 NOTE — PLAN OF CARE
Problem: Patient Care Overview (Adult)  Goal: Plan of Care Review  Outcome: Ongoing (interventions implemented as appropriate)   02/03/18 0787   Coping/Psychosocial Response Interventions   Plan Of Care Reviewed With patient   Patient Care Overview   Progress declining   Outcome Evaluation   Outcome Summary/Follow up Plan Pt medicated x1 for pain/restlessness, but pt does moan with turns, especially when touching her legs. Would premedicate for turns today to see if pt more comfortable. no fmaily at bedside. contiuecomfort care.       Problem: Fall Risk (Adult)  Goal: Absence of Falls  Outcome: Ongoing (interventions implemented as appropriate)      Problem: Pressure Ulcer Risk (Joe Scale) (Adult,Obstetrics,Pediatric)  Goal: Skin Integrity  Outcome: Ongoing (interventions implemented as appropriate)      Problem: Dying Patient, Actively (Adult)  Goal: Identify Related Risk Factors and Signs and Symptoms  Outcome: Outcome(s) achieved Date Met: 02/03/18    Goal: Comfort/Pain Control  Outcome: Ongoing (interventions implemented as appropriate)    Goal: Dying Process, Peace and Dignity  Outcome: Ongoing (interventions implemented as appropriate)

## 2018-02-03 NOTE — PLAN OF CARE
Problem: Patient Care Overview (Adult)  Goal: Plan of Care Review  Outcome: Ongoing (interventions implemented as appropriate)   02/03/18 0727 02/03/18 0804 02/03/18 1821   Coping/Psychosocial Response Interventions   Plan Of Care Reviewed With --  patient --    Patient Care Overview   Progress declining --  --    Outcome Evaluation   Outcome Summary/Follow up Plan --  --  Pt medicated x 1 for discomfort. Family at bedside. Respirations shallow. Will continue to monitor.     Goal: Adult Individualization and Mutuality  Outcome: Ongoing (interventions implemented as appropriate)    Goal: Discharge Needs Assessment  Outcome: Ongoing (interventions implemented as appropriate)      Problem: Fall Risk (Adult)  Goal: Absence of Falls  Outcome: Ongoing (interventions implemented as appropriate)      Problem: Dying Patient, Actively (Adult)  Goal: Comfort/Pain Control  Outcome: Ongoing (interventions implemented as appropriate)    Goal: Dying Process, Peace and Dignity  Outcome: Ongoing (interventions implemented as appropriate)      Problem: Pressure Ulcer (Adult)  Goal: Signs and Symptoms of Listed Potential Problems Will be Absent or Manageable (Pressure Ulcer)  Outcome: Ongoing (interventions implemented as appropriate)

## 2018-02-04 NOTE — PLAN OF CARE
Problem: Patient Care Overview (Adult)  Goal: Plan of Care Review  Outcome: Ongoing (interventions implemented as appropriate)   02/04/18 0502   Coping/Psychosocial Response Interventions   Plan Of Care Reviewed With patient   Patient Care Overview   Progress improving   Outcome Evaluation   Outcome Summary/Follow up Plan pt medicated x1 for pain, pt awake and talking some, asked for water and tolerated it well, slep some during night, will cont. to monitor and provide comfort care       Problem: Fall Risk (Adult)  Goal: Absence of Falls  Outcome: Ongoing (interventions implemented as appropriate)      Problem: Dying Patient, Actively (Adult)  Goal: Comfort/Pain Control  Outcome: Ongoing (interventions implemented as appropriate)    Goal: Dying Process, Peace and Dignity  Outcome: Ongoing (interventions implemented as appropriate)      Problem: Pressure Ulcer (Adult)  Goal: Signs and Symptoms of Listed Potential Problems Will be Absent or Manageable (Pressure Ulcer)  Outcome: Ongoing (interventions implemented as appropriate)

## 2018-02-04 NOTE — PROGRESS NOTES
"Daily progress note        Patient Identification:  Name: Angie Mckeon  Age: 88 y.o.  Sex: female  :  1929  MRN: [id number]                     Primary Care Physician: Jenelle Cabrera DO    Chief Complaint:    Comfortable  No new issues  Family at bedside    History of Present Illness:   88-year-old white female with history of chronic atrial fibrillation on anticoagulation other medical problem dementia brain tumor osteoarthritis osteoporosis who is a nursing home resident sent to the [facility] emergency room on 18after being noted to have acute onset SOA and gurgling white frothy sputum. Pt placed on bipap by EMS en route to ED. Pt is a DNR.  Patient was very confused and unable to provide any further info. Had recent hospitalization here for MI      Review of Systems  See history of present illness and past medical history.  Patient unable to provide any meaningful answers    Objective:  /58 (BP Location: Left arm, Patient Position: Lying)  Pulse 61  Temp 97 °F (36.1 °C) (Oral)   Resp 20  Ht 165.1 cm (65\")  Wt 70.4 kg (155 lb 3.2 oz)  SpO2 91%  BMI 25.83 kg/m2    Exam unchanged  .    Data Review:  No results found for: WBC, HGB, HCT, PLT  No results found for: NA, K, CL, CO2, BUN, CREATININE, GLUCOSE  No results found for: CALCIUM, MG, PHOS  No results found for: AST, ALT, ALKPHOS  No results found for: APTT, INR      Results from last 7 days  Lab Units 18  0419   TSH mIU/mL 2.870       Results from last 7 days  Lab Units 18  0419   HEMOGLOBIN A1C % 4.78*      Imaging Results (all)     Procedure Component Value Units Date/Time    XR Chest 1 View [[id number]] Collected:  18     Updated:  18    Narrative:       PORTABLE CHEST X-RAY     HISTORY: soa     COMPARISON: 2018.     FINDINGS: Portable AP view of the chest was obtained. Lungs are under  aerated but grossly  clear where visualized. Stable cardiomegaly.  Vascularity is normal.   No " obvious significant pleural fluid  collections.           Impression:          Poor inspiration without active disease identified,     This report was finalized on 1/27/2018 7:30 PM by Hernandez Bañuelos MD.             Current Facility-Administered Medications:   •  acetaminophen (TYLENOL) tablet 650 mg, 650 mg, Oral, Q4H PRN **OR** acetaminophen (TYLENOL) 160 MG/5ML solution 650 mg, 650 mg, Oral, Q4H PRN **OR** acetaminophen (TYLENOL) suppository 650 mg, 650 mg, Rectal, Q4H PRN, Alvin Calderon MD  •  diphenoxylate-atropine (LOMOTIL) 2.5-0.025 MG per tablet 1 tablet, 1 tablet, Oral, Q2H PRN, Alvin Calderon MD  •  Glycerin-Hypromellose- (ARTIFICIAL TEARS) 0.2-0.2-1 % ophthalmic solution solution 1 drop, 1 drop, Both Eyes, Q30 Min PRN, Alvin Calderon MD  •  glycopyrrolate (ROBINUL) injection 0.2 mg, 0.2 mg, Intravenous, Q2H PRN **OR** glycopyrrolate (ROBINUL) injection 0.2 mg, 0.2 mg, Subcutaneous, Q2H PRN **OR** glycopyrrolate (ROBINUL) injection 0.4 mg, 0.4 mg, Intravenous, Q2H PRN **OR** glycopyrrolate (ROBINUL) injection 0.4 mg, 0.4 mg, Subcutaneous, Q2H PRN, Alvin Calderon MD  •  LORazepam (ATIVAN) tablet 0.5 mg, 0.5 mg, Oral, Q1H PRN **OR** LORazepam (ATIVAN) injection 0.5 mg, 0.5 mg, Intravenous, Q1H PRN, 0.5 mg at 02/03/18 0205 **OR** LORazepam (ATIVAN) injection 0.5 mg, 0.5 mg, Intramuscular, Q1H PRN **OR** LORazepam (ATIVAN) 2 MG/ML concentrated solution 0.5 mg, 0.5 mg, Oral, Q1H PRN **OR** LORazepam (ATIVAN) 2 MG/ML concentrated solution 0.5 mg, 0.5 mg, Sublingual, Q1H PRN, Avlin Calderon MD  •  LORazepam (ATIVAN) tablet 1 mg, 1 mg, Oral, Q1H PRN **OR** LORazepam (ATIVAN) injection 1 mg, 1 mg, Intravenous, Q1H PRN **OR** LORazepam (ATIVAN) injection 1 mg, 1 mg, Intramuscular, Q1H PRN **OR** LORazepam (ATIVAN) 2 MG/ML concentrated solution 1 mg, 1 mg, Oral, Q1H PRN **OR** LORazepam (ATIVAN) 2 MG/ML concentrated solution 1 mg, 1 mg, Sublingual, Q1H PRN, Alvin Calderon MD  •  LORazepam (ATIVAN) tablet 2 mg, 2 mg,  Oral, Q1H PRN **OR** LORazepam (ATIVAN) injection 2 mg, 2 mg, Intravenous, Q1H PRN **OR** LORazepam (ATIVAN) injection 2 mg, 2 mg, Intramuscular, Q1H PRN **OR** LORazepam (ATIVAN) 2 MG/ML concentrated solution 2 mg, 2 mg, Oral, Q1H PRN **OR** LORazepam (ATIVAN) 2 MG/ML concentrated solution 2 mg, 2 mg, Sublingual, Q1H PRN, Alvin Calderon MD  •  magic mouthwash oral supsension 5 mL, 5 mL, Swish & Spit, Q4H PRN, Alvin Calderon MD  •  morphine injection 2 mg, 2 mg, Intravenous, Q1H PRN, 2 mg at 02/04/18 1253 **OR** morphine concentrated solution solution 10 mg, 10 mg, Oral, Q1H PRN, Alvin Calderon MD  •  promethazine (PHENERGAN) injection 6.25 mg, 6.25 mg, Intravenous, Q4H PRN **OR** promethazine (PHENERGAN) tablet 6.25 mg, 6.25 mg, Oral, Q4H PRN **OR** promethazine (PHENERGAN) 6.25 MG/5ML syrup 6.25 mg, 6.25 mg, Oral, Q4H PRN **OR** promethazine (PHENERGAN) suppository 6.25 mg, 6.25 mg, Rectal, Q4H PRN, Alvin Calderon MD     Assessment:    Respiratory distress, acute    Bradycardia asymptomatic no need for pacemaker    Acute CHF    S/p recent MI     Chronic AFib    Dementia         Plan:  Comfort Care only  Allow natural death  Routine palliative care orders  Hospice scattered bed eval  Discussed with family    Alvin Calderon MD  2/4/2018  2:41 PM

## 2018-02-04 NOTE — PLAN OF CARE
Problem: Patient Care Overview (Adult)  Goal: Plan of Care Review  Outcome: Ongoing (interventions implemented as appropriate)   02/04/18 0502 02/04/18 1742   Coping/Psychosocial Response Interventions   Plan Of Care Reviewed With patient --    Patient Care Overview   Progress improving --    Outcome Evaluation   Outcome Summary/Follow up Plan --  patient premedicated with morphine prior to turns. patient seems to have pain in her legs. patient has had vistiors all day at bedside. patient able to tell me her name and birthday. she was able to sit up in bed and drink some water today as well. will continue to monitor patient and treat according to comfort measures.       Problem: Fall Risk (Adult)  Goal: Absence of Falls  Outcome: Ongoing (interventions implemented as appropriate)      Problem: Dying Patient, Actively (Adult)  Goal: Comfort/Pain Control  Outcome: Ongoing (interventions implemented as appropriate)    Goal: Dying Process, Peace and Dignity  Outcome: Ongoing (interventions implemented as appropriate)      Problem: Pressure Ulcer (Adult)  Goal: Signs and Symptoms of Listed Potential Problems Will be Absent or Manageable (Pressure Ulcer)  Outcome: Ongoing (interventions implemented as appropriate)

## 2018-02-05 NOTE — PROGRESS NOTES
"Daily progress note        Patient Identification:  Name: Angie Mckeon  Age: 88 y.o.  Sex: female  :  1929  MRN: [id number]                     Primary Care Physician: Jenelle Cabrera DO    Chief Complaint:    Comfortable  No new issues  Family at bedside    History of Present Illness:   88-year-old white female with history of chronic atrial fibrillation on anticoagulation other medical problem dementia brain tumor osteoarthritis osteoporosis who is a nursing home resident sent to the [facility], operated by [facility] emergency room on 18after being noted to have acute onset SOA and gurgling white frothy sputum. Pt placed on bipap by EMS en route to ED. Pt is a DNR.  Patient was very confused and unable to provide any further info. Had recent hospitalization here for MI      Review of Systems  See history of present illness and past medical history.  Patient unable to provide any meaningful answers    Objective:  BP 96/62 (BP Location: Left arm, Patient Position: Lying)  Pulse 52  Temp 96.9 °F (36.1 °C) (Oral)   Resp 18  Ht 165.1 cm (65\")  Wt 70.4 kg (155 lb 3.2 oz)  SpO2 93%  BMI 25.83 kg/m2    Exam unchanged  .    Data Review:  No results found for: WBC, HGB, HCT, PLT  No results found for: NA, K, CL, CO2, BUN, CREATININE, GLUCOSE  No results found for: CALCIUM, MG, PHOS  No results found for: AST, ALT, ALKPHOS  No results found for: APTT, INR      Results from last 7 days  Lab Units 18  0419   TSH mIU/mL 2.870       Results from last 7 days  Lab Units 18  0419   HEMOGLOBIN A1C % 4.78*      Imaging Results (all)     Procedure Component Value Units Date/Time    XR Chest 1 View [[id number]] Collected:  18     Updated:  18    Narrative:       PORTABLE CHEST X-RAY     HISTORY: soa     COMPARISON: 2018.     FINDINGS: Portable AP view of the chest was obtained. Lungs are under  aerated but grossly  clear where visualized. Stable cardiomegaly.  Vascularity is normal.   " No obvious significant pleural fluid  collections.           Impression:          Poor inspiration without active disease identified,     This report was finalized on 1/27/2018 7:30 PM by Hernandez Bañuelos MD.             Current Facility-Administered Medications:   •  acetaminophen (TYLENOL) tablet 650 mg, 650 mg, Oral, Q4H PRN, 650 mg at 02/05/18 0817 **OR** acetaminophen (TYLENOL) 160 MG/5ML solution 650 mg, 650 mg, Oral, Q4H PRN **OR** acetaminophen (TYLENOL) suppository 650 mg, 650 mg, Rectal, Q4H PRN, Alvin Calderon MD  •  diphenoxylate-atropine (LOMOTIL) 2.5-0.025 MG per tablet 1 tablet, 1 tablet, Oral, Q2H PRN, Alvin Calderon MD  •  Glycerin-Hypromellose- (ARTIFICIAL TEARS) 0.2-0.2-1 % ophthalmic solution solution 1 drop, 1 drop, Both Eyes, Q30 Min PRN, Alvin Calderon MD  •  glycopyrrolate (ROBINUL) injection 0.2 mg, 0.2 mg, Intravenous, Q2H PRN **OR** glycopyrrolate (ROBINUL) injection 0.2 mg, 0.2 mg, Subcutaneous, Q2H PRN **OR** glycopyrrolate (ROBINUL) injection 0.4 mg, 0.4 mg, Intravenous, Q2H PRN **OR** glycopyrrolate (ROBINUL) injection 0.4 mg, 0.4 mg, Subcutaneous, Q2H PRN, Alvin Calderon MD  •  LORazepam (ATIVAN) tablet 0.5 mg, 0.5 mg, Oral, Q1H PRN **OR** LORazepam (ATIVAN) injection 0.5 mg, 0.5 mg, Intravenous, Q1H PRN, 0.5 mg at 02/05/18 1057 **OR** LORazepam (ATIVAN) injection 0.5 mg, 0.5 mg, Intramuscular, Q1H PRN **OR** LORazepam (ATIVAN) 2 MG/ML concentrated solution 0.5 mg, 0.5 mg, Oral, Q1H PRN **OR** LORazepam (ATIVAN) 2 MG/ML concentrated solution 0.5 mg, 0.5 mg, Sublingual, Q1H PRN, Alvin Calderon MD  •  LORazepam (ATIVAN) tablet 1 mg, 1 mg, Oral, Q1H PRN **OR** LORazepam (ATIVAN) injection 1 mg, 1 mg, Intravenous, Q1H PRN **OR** LORazepam (ATIVAN) injection 1 mg, 1 mg, Intramuscular, Q1H PRN **OR** LORazepam (ATIVAN) 2 MG/ML concentrated solution 1 mg, 1 mg, Oral, Q1H PRN **OR** LORazepam (ATIVAN) 2 MG/ML concentrated solution 1 mg, 1 mg, Sublingual, Q1H PRN, Alvin Calderon MD  •  LORazepam  (ATIVAN) tablet 2 mg, 2 mg, Oral, Q1H PRN **OR** LORazepam (ATIVAN) injection 2 mg, 2 mg, Intravenous, Q1H PRN **OR** LORazepam (ATIVAN) injection 2 mg, 2 mg, Intramuscular, Q1H PRN **OR** LORazepam (ATIVAN) 2 MG/ML concentrated solution 2 mg, 2 mg, Oral, Q1H PRN **OR** LORazepam (ATIVAN) 2 MG/ML concentrated solution 2 mg, 2 mg, Sublingual, Q1H PRN, Alvin Calderon MD  •  magic mouthwash oral supsension 5 mL, 5 mL, Swish & Spit, Q4H PRN, Alvin Calderon MD  •  morphine injection 2 mg, 2 mg, Intravenous, Q1H PRN, 2 mg at 02/05/18 0451 **OR** morphine concentrated solution solution 10 mg, 10 mg, Oral, Q1H PRN, Alvin Calderon MD  •  promethazine (PHENERGAN) injection 6.25 mg, 6.25 mg, Intravenous, Q4H PRN **OR** promethazine (PHENERGAN) tablet 6.25 mg, 6.25 mg, Oral, Q4H PRN **OR** promethazine (PHENERGAN) 6.25 MG/5ML syrup 6.25 mg, 6.25 mg, Oral, Q4H PRN **OR** promethazine (PHENERGAN) suppository 6.25 mg, 6.25 mg, Rectal, Q4H PRN, Alvin Calderon MD     Assessment:    Respiratory distress, acute    Bradycardia asymptomatic no need for pacemaker    Acute CHF    S/p recent MI     Chronic AFib    Dementia         Plan:  Comfort Care only  Allow natural death  Routine palliative care orders  Discussed with family  [facility]  Combined discharge summary and history and physical dictated    Alvin Calderon MD  2/5/2018  2:41 PM

## 2018-02-05 NOTE — CONSULTS
Met with patient's daughter/porenee Levine and patient's sister/jose Ramirez.  [facility] explanation of services given with focus on [facility] admission.  Patient is appropriate for [facility] admission for diagnosis of CAD- ICD 10 code I25.119  and symptom management of pain.  Consent forms reviewed and signed, [facility] informational folder given.  Notified CCP Julianna Hughes, Charge Nurse Padma, Staff RN Edmund Guerra @ [facility], Dr. Calderon agreeable to discharge and readmit today.    Thank you for this referral    CARLA Orona  [facility]  [phone]

## 2018-02-05 NOTE — PLAN OF CARE
Problem: Patient Care Overview (Adult)  Goal: Plan of Care Review  Outcome: Ongoing (interventions implemented as appropriate)   02/05/18 3925   Coping/Psychosocial Response Interventions   Plan Of Care Reviewed With patient;family   Patient Care Overview   Progress no change   Outcome Evaluation   Outcome Summary/Follow up Plan patient was alert this morning and ate breakfast with assistance. she requested and was given tylenol for right shoulder pain at that time. for the rest of the day she has been quite drowsy, opens eyes spontaneously and responds to voice and touch but not interactive. premedicated with ativan for turns and morphine as needed for pain. daughter has been at the bedside. will continue to provide comfort measures.       Problem: Fall Risk (Adult)  Goal: Absence of Falls  Outcome: Ongoing (interventions implemented as appropriate)      Problem: Dying Patient, Actively (Adult)  Goal: Comfort/Pain Control  Outcome: Ongoing (interventions implemented as appropriate)    Goal: Dying Process, Peace and Dignity  Outcome: Ongoing (interventions implemented as appropriate)      Problem: Pressure Ulcer (Adult)  Goal: Signs and Symptoms of Listed Potential Problems Will be Absent or Manageable (Pressure Ulcer)  Outcome: Ongoing (interventions implemented as appropriate)

## 2018-02-05 NOTE — SIGNIFICANT NOTE
Orders noted to cancel speech therapy orders.  Pt now receiving Palliative care.  Will sign off.     02/05/18 1126   Rehab Treatment   Discipline speech language pathologist

## 2018-02-05 NOTE — PLAN OF CARE
Problem: Patient Care Overview (Adult)  Goal: Plan of Care Review  Outcome: Ongoing (interventions implemented as appropriate)  Continue symptom management and comfort care.   02/05/18 0409   Outcome Evaluation   Outcome Summary/Follow up Plan Patient medicated Q4 hours prior to turns. No S&S of pain or anxiety. Rested well overnight.     Goal: Adult Individualization and Mutuality  Outcome: Ongoing (interventions implemented as appropriate)    Goal: Discharge Needs Assessment  Outcome: Ongoing (interventions implemented as appropriate)      Problem: Fall Risk (Adult)  Goal: Absence of Falls  Outcome: Ongoing (interventions implemented as appropriate)      Problem: Dying Patient, Actively (Adult)  Goal: Comfort/Pain Control  Outcome: Ongoing (interventions implemented as appropriate)    Goal: Dying Process, Peace and Dignity  Outcome: Ongoing (interventions implemented as appropriate)      Problem: Pressure Ulcer (Adult)  Goal: Signs and Symptoms of Listed Potential Problems Will be Absent or Manageable (Pressure Ulcer)  Outcome: Ongoing (interventions implemented as appropriate)

## 2018-02-05 NOTE — THERAPY DISCHARGE NOTE
Acute Care - Speech Language Pathology Discharge  [facility]     Patient Name: Angie Mckeon  : 1929  MRN: [id number]  Today's Date: 2018  Onset of Illness/Injury or Date of Surgery Date: 18      SPEECH-LANGUAGE PATHOLOGY EVALUATION - SWALLOW  Subjective: Pt was seen for a bedside swallow eval prior to Palliative care orders and transfer.  Objective:  Pt was alert and able to participate during the bedside swallow eval.   Assessment:  Refer to the bedside swallow evaluation for the assessment and findings.  Recommendations: Diet Textures: thin liquid, mechanical soft consistency food with chopped meats.  Medications should be taken whole or crushed with puree or thin liquids.  Pt/family may wish for diet per quality of life.   Recommended Strategies: Upright for PO, small bites and sips, may use straw and supervision with all PO. Oral care before breakfast, after all meals and PRN.    Dysphagia therapy is not recommended. Rationale: Allow diet per QOL as patient is receiving Palliative Care.            Admit Date: 2018     Visit Dx:    ICD-10-CM ICD-9-CM   1. Respiratory distress, acute R06.03 518.82   2. Hypoxia R09.02 799.02   3. Chronic systolic congestive heart failure I50.22 428.22     428.0   4. Chronic a-fib I48.2 427.31   5. Muscle weakness (generalized) M62.81 728.87     Patient Active Problem List   Diagnosis   • Permanent atrial fibrillation   • Dementia   • Elevated troponin   • Respiratory distress, acute     Past Medical History:   Diagnosis Date   • Dementia    • History of brain tumor     X2   • Hyperlipidemia    • Hypertension    • Osteoporosis    • Permanent atrial fibrillation    • Sleep apnea    • Stroke    • Tremor    • Valvular heart disease     s/p mechanical AVR/MVR     Past Surgical History:   Procedure Laterality Date   • CARDIAC VALVE REPLACEMENT     • HYSTERECTOMY     • JOINT REPLACEMENT      SULTANA. HIP            EDUCATION  The patient has been educated in the  following areas:   Dysphagia (Swallowing Impairment).    SLP Recommendation and Plan                                          IP SLP Goals       02/01/18 1338 01/29/18 1211       Safely Consume Diet    Safely Consume Diet- SLP, Date Established  01/29/18  -SH     Safely Consume Diet- SLP, Time to Achieve by discharge  -NR by discharge  -SH     Safely Consume Diet- SLP, Activity Level Patient will improve laryngeal elevation for safer, more efficient swallow  -NR        User Key  (r) = Recorded By, (t) = Taken By, (c) = Cosigned By    Initials Name Provider Type    NR Safia Forman MA,[facility]-SLP Speech and Language Pathologist     Maite Mercado MS CCC-SLP Speech and Language Pathologist              Time Calculation:               SLP G-Codes  SLP NOMS Used?: Yes  Functional Limitations: Swallowing  Swallow Current Status (): At least 20 percent but less than 40 percent impaired, limited or restricted  Swallow Goal Status (): At least 1 percent but less than 20 percent impaired, limited or restricted  Swallow Discharge Status (): At least 20 percent but less than 40 percent impaired, limited or restricted         Safia Forman MA,CCC-SLP  2/5/2018

## 2018-02-05 NOTE — PROGRESS NOTES
Discharge Planning Assessment  [facility]     Patient Name: Angie Mckeon  MRN: [id number]  Today's Date: 2/5/2018    Admit Date: 1/27/2018          Discharge Needs Assessment     None            Discharge Plan       02/05/18 1520    Case Management/Social Work Plan    Plan [facility]     Patient/Family In Agreement With Plan yes    Additional Comments Patient was admitted to [facility] 2/5/18. Yanira CUEVAS       02/05/18 1240    Final Note    Final Note The patient was admitted to a [facility] bed on 2/5/18. ALETA Hughes RN, CCP        Discharge Placement     Facility/Agency Request Status Selected? Address Phone Number Fax Number    [facility] - [facility]     [address] [facility] [address] [phone]        Expected Discharge Date and Time     Expected Discharge Date Expected Discharge Time    Feb 5, 2018               Demographic Summary       02/05/18 1518    Referral Information    Admission Type inpatient    Arrived From hospice/medical facility            Functional Status     None            Psychosocial     None            Abuse/Neglect     None            Legal       02/05/18 1518    Legal    Assistance with Managing/Advocating Healthcare Needs power of  for healthcare    Legal Comments Julianna Walker             Substance Abuse     None            Patient Forms     None          FAITH Lamb

## 2018-02-05 NOTE — DISCHARGE SUMMARY
Combined discharge summary and history and physical    Date of admission 1/27/2018  Date of discharge 2/5/2018  Date of admission hospice scattered bed 2/5/2018    Final diagnosis    Respiratory distress, acute    Bradycardia asymptomatic no need for pacemaker    Acute CHF    S/p recent MI     Chronic AFib    Dementia      Current Facility-Administered Medications:   •  acetaminophen (TYLENOL) tablet 650 mg, 650 mg, Oral, Q4H PRN, 650 mg at 02/05/18 0817 **OR** acetaminophen (TYLENOL) 160 MG/5ML solution 650 mg, 650 mg, Oral, Q4H PRN **OR** acetaminophen (TYLENOL) suppository 650 mg, 650 mg, Rectal, Q4H PRN, Alvin Calderon MD  •  diphenoxylate-atropine (LOMOTIL) 2.5-0.025 MG per tablet 1 tablet, 1 tablet, Oral, Q2H PRN, Alvin Calderon MD  •  Glycerin-Hypromellose- (ARTIFICIAL TEARS) 0.2-0.2-1 % ophthalmic solution solution 1 drop, 1 drop, Both Eyes, Q30 Min PRN, Alvin Cadleron MD  •  glycopyrrolate (ROBINUL) injection 0.2 mg, 0.2 mg, Intravenous, Q2H PRN **OR** glycopyrrolate (ROBINUL) injection 0.2 mg, 0.2 mg, Subcutaneous, Q2H PRN **OR** glycopyrrolate (ROBINUL) injection 0.4 mg, 0.4 mg, Intravenous, Q2H PRN **OR** glycopyrrolate (ROBINUL) injection 0.4 mg, 0.4 mg, Subcutaneous, Q2H PRN, Alvin Calderon MD  •  LORazepam (ATIVAN) tablet 0.5 mg, 0.5 mg, Oral, Q1H PRN **OR** LORazepam (ATIVAN) injection 0.5 mg, 0.5 mg, Intravenous, Q1H PRN, 0.5 mg at 02/05/18 1057 **OR** LORazepam (ATIVAN) injection 0.5 mg, 0.5 mg, Intramuscular, Q1H PRN **OR** LORazepam (ATIVAN) 2 MG/ML concentrated solution 0.5 mg, 0.5 mg, Oral, Q1H PRN **OR** LORazepam (ATIVAN) 2 MG/ML concentrated solution 0.5 mg, 0.5 mg, Sublingual, Q1H PRN, Alvin Calderon MD  •  LORazepam (ATIVAN) tablet 1 mg, 1 mg, Oral, Q1H PRN **OR** LORazepam (ATIVAN) injection 1 mg, 1 mg, Intravenous, Q1H PRN **OR** LORazepam (ATIVAN) injection 1 mg, 1 mg, Intramuscular, Q1H PRN **OR** LORazepam (ATIVAN) 2 MG/ML concentrated solution 1 mg, 1 mg, Oral, Q1H PRN **OR** LORazepam  (ATIVAN) 2 MG/ML concentrated solution 1 mg, 1 mg, Sublingual, Q1H PRN, Alvin Calderon MD  •  LORazepam (ATIVAN) tablet 2 mg, 2 mg, Oral, Q1H PRN **OR** LORazepam (ATIVAN) injection 2 mg, 2 mg, Intravenous, Q1H PRN **OR** LORazepam (ATIVAN) injection 2 mg, 2 mg, Intramuscular, Q1H PRN **OR** LORazepam (ATIVAN) 2 MG/ML concentrated solution 2 mg, 2 mg, Oral, Q1H PRN **OR** LORazepam (ATIVAN) 2 MG/ML concentrated solution 2 mg, 2 mg, Sublingual, Q1H PRN, Alvin Calderon MD  •  magic mouthwash oral supsension 5 mL, 5 mL, Swish & Spit, Q4H PRN, Alvin Calderon MD  •  morphine injection 2 mg, 2 mg, Intravenous, Q1H PRN, 2 mg at 02/05/18 0451 **OR** morphine concentrated solution solution 10 mg, 10 mg, Oral, Q1H PRN, Alvin Calderon MD  •  promethazine (PHENERGAN) injection 6.25 mg, 6.25 mg, Intravenous, Q4H PRN **OR** promethazine (PHENERGAN) tablet 6.25 mg, 6.25 mg, Oral, Q4H PRN **OR** promethazine (PHENERGAN) 6.25 MG/5ML syrup 6.25 mg, 6.25 mg, Oral, Q4H PRN **OR** promethazine (PHENERGAN) suppository 6.25 mg, 6.25 mg, Rectal, Q4H PRN, Alvin Calderon MD     Consult obtained   Cardiology  Palliative care  Hospice    Procedures  None    Hospital course  88-year-old white female who is well-known to her service with history of chronic atrial fibrillation dementia coronary artery disease congestive heart failure admitted through emergency room with shortness of breath.  Taking evaluated in ER found to be in decompensated CHF admitted for management.  Patient admitted and was diuresed supported with supplemental oxygen nebulizer.  Cardiology followed the patient.  Patient was DNR and was active with hospice.  Patient has very minimal response and after discussion with family decided to transfer to palliative care unit for comfort care.  As patient has poor quality of life as a transfer to palliative care unit for family and patient wishes where she remains comfortable and now qualify for hospice scattered bed.    Physical exam  Blood  "pressure 96/62, pulse 52, temperature 96.9 °F (36.1 °C), temperature source Oral, resp. rate 18, height 165.1 cm (65\"), weight 70.4 kg (155 lb 3.2 oz), SpO2 93 %.    Lungs decreased breath sounds  Heart S1-S2  Abdomen soft hypoactive bowel sounds  Extremities 2+ edema  Neuro minimally responsive    LABS  Lab Results (last 24 hours)     ** No results found for the last 24 hours. **        Imaging Results (last 24 hours)     ** No results found for the last 24 hours. **          PLAN  Transfer patient to hospice scattered bed  Continue all routine palliative care orders  Allow natural death  Discussed with family  Comfort Care only  Will follow in hospice scattered bed    MAYUR IBARRA MD    "

## 2018-02-05 NOTE — PROGRESS NOTES
Case Management Discharge Note    Final Note: The patient was admitted to a [facility] scattered bed on 2/5/18. ALETA Hughes RN, CCP    Discharge Placement     Facility/Agency Request Status Selected? Address Phone Number Fax Number    [facility] - Request Sent     [address] [facility] [address] [phone]             Discharge Codes: 51  Hospice - medical facility

## 2018-02-06 PROBLEM — Z51.5 HOSPICE CARE: Status: ACTIVE | Noted: 2018-01-01

## 2018-02-06 NOTE — PLAN OF CARE
Problem: Patient Care Overview (Adult)  Goal: Plan of Care Review  Outcome: Ongoing (interventions implemented as appropriate)   02/06/18 1536   Patient Care Overview   Progress no change   Outcome Evaluation   Outcome Summary/Follow up Plan Patient was slightly agitated this morning and crying out but unintelligible. medicated with morphine and ativan, which patient tolerated well. appears to be resting comfortably the rest of the day. premedicated prior to turns. no s/s pain or anxiety, will continue to provide comfort measures   Coping/Psychosocial Response Interventions   Plan Of Care Reviewed With patient;daughter       Problem: Dying Patient, Actively (Adult)  Goal: Comfort/Pain Control  Outcome: Ongoing (interventions implemented as appropriate)    Goal: Dying Process, Peace and Dignity  Outcome: Ongoing (interventions implemented as appropriate)      Problem: Pressure Ulcer (Adult)  Goal: Signs and Symptoms of Listed Potential Problems Will be Absent or Manageable (Pressure Ulcer)  Outcome: Ongoing (interventions implemented as appropriate)

## 2018-02-06 NOTE — PROGRESS NOTES
Daily progress note        Patient Identification:  Name: Angie Mckeon  Age: 88 y.o.  Sex: female  :  1929  MRN: [id number]                     Primary Care Physician: Jenelle Cabrera DO    Chief Complaint:    Comfortable  No new issues  Family at bedside    History of Present Illness:   88-year-old white female with history of chronic atrial fibrillation on anticoagulation other medical problem dementia brain tumor osteoarthritis osteoporosis who is a nursing home resident sent to the [facility] emergency room on 18after being noted to have acute onset SOA and gurgling white frothy sputum. Pt placed on bipap by EMS en route to ED. Pt is a DNR.  Patient was very confused and unable to provide any further info. Had recent hospitalization here for MI      Review of Systems  See history of present illness and past medical history.  Patient unable to provide any meaningful answers    Objective:  /70 (BP Location: Left arm, Patient Position: Lying)  Pulse 63  Temp 98.6 °F (37 °C) (Oral)   Resp 14  SpO2 91%    Exam unchanged  .    Data Review:  No results found for: WBC, HGB, HCT, PLT  No results found for: NA, K, CL, CO2, BUN, CREATININE, GLUCOSE  No results found for: CALCIUM, MG, PHOS  No results found for: AST, ALT, ALKPHOS  No results found for: APTT, INR             Imaging Results (all)     Procedure Component Value Units Date/Time    XR Chest 1 View [[id number]] Collected:  18     Updated:  18    Narrative:       PORTABLE CHEST X-RAY     HISTORY: soa     COMPARISON: 2018.     FINDINGS: Portable AP view of the chest was obtained. Lungs are under  aerated but grossly  clear where visualized. Stable cardiomegaly.  Vascularity is normal.   No obvious significant pleural fluid  collections.           Impression:          Poor inspiration without active disease identified,     This report was finalized on 2018 7:30 PM by Hernandez Bañuelos MD.              Current Facility-Administered Medications:   •  acetaminophen (TYLENOL) tablet 650 mg, 650 mg, Oral, Q4H PRN **OR** acetaminophen (TYLENOL) 160 MG/5ML solution 650 mg, 650 mg, Oral, Q4H PRN **OR** acetaminophen (TYLENOL) suppository 650 mg, 650 mg, Rectal, Q4H PRN, Alvin Calderon MD  •  diphenoxylate-atropine (LOMOTIL) 2.5-0.025 MG per tablet 1 tablet, 1 tablet, Oral, Q2H PRN, Alvin Calderon MD  •  Glycerin-Hypromellose- (ARTIFICIAL TEARS) 0.2-0.2-1 % ophthalmic solution solution 1 drop, 1 drop, Both Eyes, Q30 Min PRN, Alvin Calderon MD  •  glycopyrrolate (ROBINUL) injection 0.2 mg, 0.2 mg, Intravenous, Q2H PRN **OR** glycopyrrolate (ROBINUL) injection 0.2 mg, 0.2 mg, Subcutaneous, Q2H PRN **OR** glycopyrrolate (ROBINUL) injection 0.4 mg, 0.4 mg, Intravenous, Q2H PRN **OR** glycopyrrolate (ROBINUL) injection 0.4 mg, 0.4 mg, Subcutaneous, Q2H PRN, Alvin Calderon MD  •  LORazepam (ATIVAN) tablet 0.5 mg, 0.5 mg, Oral, Q1H PRN **OR** LORazepam (ATIVAN) injection 0.5 mg, 0.5 mg, Intravenous, Q1H PRN, 0.5 mg at 02/05/18 1842 **OR** LORazepam (ATIVAN) injection 0.5 mg, 0.5 mg, Intramuscular, Q1H PRN **OR** LORazepam (ATIVAN) 2 MG/ML concentrated solution 0.5 mg, 0.5 mg, Oral, Q1H PRN **OR** LORazepam (ATIVAN) 2 MG/ML concentrated solution 0.5 mg, 0.5 mg, Sublingual, Q1H PRN, Alvin Calderon MD  •  LORazepam (ATIVAN) tablet 1 mg, 1 mg, Oral, Q1H PRN **OR** LORazepam (ATIVAN) injection 1 mg, 1 mg, Intravenous, Q1H PRN, 1 mg at 02/06/18 1215 **OR** LORazepam (ATIVAN) injection 1 mg, 1 mg, Intramuscular, Q1H PRN **OR** LORazepam (ATIVAN) 2 MG/ML concentrated solution 1 mg, 1 mg, Oral, Q1H PRN **OR** LORazepam (ATIVAN) 2 MG/ML concentrated solution 1 mg, 1 mg, Sublingual, Q1H PRN, Alvindarrel Calderon MD  •  LORazepam (ATIVAN) tablet 2 mg, 2 mg, Oral, Q1H PRN **OR** LORazepam (ATIVAN) injection 2 mg, 2 mg, Intravenous, Q1H PRN **OR** LORazepam (ATIVAN) injection 2 mg, 2 mg, Intramuscular, Q1H PRN **OR** LORazepam (ATIVAN) 2 MG/ML  concentrated solution 2 mg, 2 mg, Oral, Q1H PRN **OR** LORazepam (ATIVAN) 2 MG/ML concentrated solution 2 mg, 2 mg, Sublingual, Q1H PRN, Alvin Calderon MD  •  magic mouthwash oral supsension 5 mL, 5 mL, Swish & Spit, Q4H PRN, Alvin Calderon MD  •  morphine injection 2 mg, 2 mg, Intravenous, Q1H PRN, 2 mg at 02/06/18 1215 **OR** morphine concentrated solution solution 10 mg, 10 mg, Oral, Q1H PRN, Alvin Calderon MD  •  promethazine (PHENERGAN) injection 6.25 mg, 6.25 mg, Intravenous, Q4H PRN **OR** promethazine (PHENERGAN) tablet 6.25 mg, 6.25 mg, Oral, Q4H PRN **OR** promethazine (PHENERGAN) 6.25 MG/5ML syrup 6.25 mg, 6.25 mg, Oral, Q4H PRN **OR** promethazine (PHENERGAN) suppository 6.25 mg, 6.25 mg, Rectal, Q4H PRN, Alvin Calderon MD     Assessment:    Respiratory distress, acute    Bradycardia asymptomatic no need for pacemaker    Acute CHF    S/p recent MI     Chronic AFib    Dementia         Plan:  HSB  Comfort Care only  Allow natural death  Routine palliative care orders  Discussed with family  Use my discharge summary as history and physical    Alvin Calderon MD  2/6/2018  1:14 PM

## 2018-02-06 NOTE — PROGRESS NOTES
[facility] Visit Report    Angie Mckeon  [id number]  2/6/2018    Admission R/T [facility] Dx: yes    Reason for [facility] Admission:    Symptom  Management: Pain Control    Nursing/Medication Recommendations:    Psychosocial Issues and Recommendations:    Spiritual Concerns and Recommendations:    [facility] Discharge Plans:  incomplete; patient remains HSB and [facility] will round daily    Review of Visit (Include All Collaboration- including names of hospital and family involved during admission/visit):  P collab with [facility] RN Karen, no concerns voiced; since midnight pt has received IV Ativan 1 mg x2, IV morphine 2 mg x3; VS: T98.6, P63, R14, /70, O2=91%@RA;    Pt vale Julianna present with pt, pt non-responsive, pt having periods of 20-40 sec apnea; vale Julianna feels her mother is comfortable, P provided supportive active listening as Julianna shared stories of growing up and recent hx of pt; neither pt nor Julianna are affiliated with a Jainism/janet background, but when asked, Julianna stated she had adequate spiritual support via family and friends.  Julianna thanked [facility] for visit and support; Julianna had asked and [facility] sent msg to [facility] SW re possible letter to the [employer].    [facility] to round daily to assess and offer support.      Christopher Kumar, BCC

## 2018-02-06 NOTE — PLAN OF CARE
Problem: Patient Care Overview (Adult)  Goal: Plan of Care Review  Outcome: Ongoing (interventions implemented as appropriate)  Continue symptom management   02/05/18 2014 02/06/18 0448   Patient Care Overview   Progress --  no change   Outcome Evaluation   Outcome Summary/Follow up Plan --  Patient rested well overnight. Pain medication given Q4 hours with turns. No S&S of pain or anxiety.   Coping/Psychosocial Response Interventions   Plan Of Care Reviewed With patient --     and comfort care  Goal: Adult Individualization and Mutuality  Outcome: Ongoing (interventions implemented as appropriate)    Goal: Discharge Needs Assessment  Outcome: Ongoing (interventions implemented as appropriate)      Problem: Dying Patient, Actively (Adult)  Goal: Comfort/Pain Control  Outcome: Ongoing (interventions implemented as appropriate)    Goal: Dying Process, Peace and Dignity  Outcome: Ongoing (interventions implemented as appropriate)      Problem: Pressure Ulcer (Adult)  Goal: Signs and Symptoms of Listed Potential Problems Will be Absent or Manageable (Pressure Ulcer)  Outcome: Ongoing (interventions implemented as appropriate)

## 2018-02-07 NOTE — PLAN OF CARE
Problem: Patient Care Overview (Adult)  Goal: Plan of Care Review  Outcome: Ongoing (interventions implemented as appropriate)   02/07/18 3974   Patient Care Overview   Progress no change   Outcome Evaluation   Outcome Summary/Follow up Plan Pt demonstrated anxiety with repositioning, would recommend increasing ativan prior to next turn. Spoke with family about this, they are agreeable. Would continue premedicating prior to activity. Pt has had apenic episodes. F/c in place. Family at bedside. Appears comfortable currently. Will continue to monitor and provide comfort care.   Coping/Psychosocial Response Interventions   Plan Of Care Reviewed With patient;family     Goal: Adult Individualization and Mutuality  Outcome: Ongoing (interventions implemented as appropriate)    Goal: Discharge Needs Assessment  Outcome: Ongoing (interventions implemented as appropriate)      Problem: Dying Patient, Actively (Adult)  Goal: Comfort/Pain Control  Outcome: Ongoing (interventions implemented as appropriate)    Goal: Dying Process, Peace and Dignity  Outcome: Ongoing (interventions implemented as appropriate)      Problem: Pressure Ulcer (Adult)  Goal: Signs and Symptoms of Listed Potential Problems Will be Absent or Manageable (Pressure Ulcer)  Outcome: Ongoing (interventions implemented as appropriate)

## 2018-02-07 NOTE — CONSULTS
initiated visit with pt however she was sleeping;  conversed with Julianna, patient's biological daughter. Spiritual assessment: Pt is not a person of janet.  Pt is supported by Julianna and pt's siblings.  Julianna, told  that she reconnected with her biological mother over 25 years ago following her adoption.  Julianna's adoptive mother and brother  last year and a close friend has blood cancer. She relies on philosophy and friends to give her support.

## 2018-02-07 NOTE — PLAN OF CARE
Problem: Patient Care Overview (Adult)  Goal: Plan of Care Review  Outcome: Ongoing (interventions implemented as appropriate)   02/07/18 1508   Patient Care Overview   Progress declining   Outcome Evaluation   Outcome Summary/Follow up Plan Pt less responsive today. Daughter at bedside. Premedicated prior to repositioning. Will continue to monitor.   Coping/Psychosocial Response Interventions   Plan Of Care Reviewed With daughter     Goal: Adult Individualization and Mutuality  Outcome: Ongoing (interventions implemented as appropriate)    Goal: Discharge Needs Assessment  Outcome: Ongoing (interventions implemented as appropriate)      Problem: Dying Patient, Actively (Adult)  Goal: Comfort/Pain Control  Outcome: Ongoing (interventions implemented as appropriate)    Goal: Dying Process, Peace and Dignity  Outcome: Ongoing (interventions implemented as appropriate)      Problem: Pressure Ulcer (Adult)  Goal: Signs and Symptoms of Listed Potential Problems Will be Absent or Manageable (Pressure Ulcer)  Outcome: Ongoing (interventions implemented as appropriate)

## 2018-02-08 NOTE — PROGRESS NOTES
Daily progress note        Patient Identification:  Name: Angie Mckeon  Age: 88 y.o.  Sex: female  :  1929  MRN: [id number]                     Primary Care Physician: Jenelle Cabrera DO    Chief Complaint:    Comfortable  No new issues  Family at bedside    History of Present Illness:   88-year-old white female with history of chronic atrial fibrillation on anticoagulation other medical problem dementia brain tumor osteoarthritis osteoporosis who is a nursing home resident sent to the [facility] emergency room on 18after being noted to have acute onset SOA and gurgling white frothy sputum. Pt placed on bipap by EMS en route to ED. Pt is a DNR.  Patient was very confused and unable to provide any further info. Had recent hospitalization here for MI      Review of Systems  See history of present illness and past medical history.  Patient unable to provide any meaningful answers    Objective:  /67 (BP Location: Right arm, Patient Position: Lying)  Pulse 88  Temp 98.1 °F (36.7 °C) (Oral)   Resp 14  SpO2 97%    Exam unchanged  .    Data Review:  No results found for: WBC, HGB, HCT, PLT  No results found for: NA, K, CL, CO2, BUN, CREATININE, GLUCOSE  No results found for: CALCIUM, MG, PHOS  No results found for: AST, ALT, ALKPHOS  No results found for: APTT, INR             Imaging Results (all)     Procedure Component Value Units Date/Time    XR Chest 1 View [[id number]] Collected:  18     Updated:  18    Narrative:       PORTABLE CHEST X-RAY     HISTORY: soa     COMPARISON: 2018.     FINDINGS: Portable AP view of the chest was obtained. Lungs are under  aerated but grossly  clear where visualized. Stable cardiomegaly.  Vascularity is normal.   No obvious significant pleural fluid  collections.           Impression:          Poor inspiration without active disease identified,     This report was finalized on 2018 7:30 PM by Hernandez Bañuelos MD.              Current Facility-Administered Medications:   •  acetaminophen (TYLENOL) tablet 650 mg, 650 mg, Oral, Q4H PRN **OR** acetaminophen (TYLENOL) 160 MG/5ML solution 650 mg, 650 mg, Oral, Q4H PRN **OR** acetaminophen (TYLENOL) suppository 650 mg, 650 mg, Rectal, Q4H PRN, Alvin Calderon MD  •  diphenoxylate-atropine (LOMOTIL) 2.5-0.025 MG per tablet 1 tablet, 1 tablet, Oral, Q2H PRN, Alvin Calderon MD  •  Glycerin-Hypromellose- (ARTIFICIAL TEARS) 0.2-0.2-1 % ophthalmic solution solution 1 drop, 1 drop, Both Eyes, Q30 Min PRN, Alvin Calderon MD  •  glycopyrrolate (ROBINUL) injection 0.2 mg, 0.2 mg, Intravenous, Q2H PRN **OR** glycopyrrolate (ROBINUL) injection 0.2 mg, 0.2 mg, Subcutaneous, Q2H PRN **OR** glycopyrrolate (ROBINUL) injection 0.4 mg, 0.4 mg, Intravenous, Q2H PRN, 0.4 mg at 02/08/18 1230 **OR** glycopyrrolate (ROBINUL) injection 0.4 mg, 0.4 mg, Subcutaneous, Q2H PRN, Alvin Calderon MD  •  LORazepam (ATIVAN) tablet 0.5 mg, 0.5 mg, Oral, Q1H PRN **OR** LORazepam (ATIVAN) injection 0.5 mg, 0.5 mg, Intravenous, Q1H PRN, 0.5 mg at 02/05/18 1842 **OR** LORazepam (ATIVAN) injection 0.5 mg, 0.5 mg, Intramuscular, Q1H PRN **OR** LORazepam (ATIVAN) 2 MG/ML concentrated solution 0.5 mg, 0.5 mg, Oral, Q1H PRN **OR** LORazepam (ATIVAN) 2 MG/ML concentrated solution 0.5 mg, 0.5 mg, Sublingual, Q1H PRN, Alvin Calderon MD  •  LORazepam (ATIVAN) tablet 1 mg, 1 mg, Oral, Q1H PRN **OR** LORazepam (ATIVAN) injection 1 mg, 1 mg, Intravenous, Q1H PRN, 1 mg at 02/07/18 1641 **OR** LORazepam (ATIVAN) injection 1 mg, 1 mg, Intramuscular, Q1H PRN **OR** LORazepam (ATIVAN) 2 MG/ML concentrated solution 1 mg, 1 mg, Oral, Q1H PRN **OR** LORazepam (ATIVAN) 2 MG/ML concentrated solution 1 mg, 1 mg, Sublingual, Q1H PRN, Alvin MD Kvng  •  LORazepam (ATIVAN) tablet 2 mg, 2 mg, Oral, Q1H PRN **OR** LORazepam (ATIVAN) injection 2 mg, 2 mg, Intravenous, Q1H PRN, 2 mg at 02/08/18 1231 **OR** LORazepam (ATIVAN) injection 2 mg, 2 mg,  Intramuscular, Q1H PRN **OR** LORazepam (ATIVAN) 2 MG/ML concentrated solution 2 mg, 2 mg, Oral, Q1H PRN **OR** LORazepam (ATIVAN) 2 MG/ML concentrated solution 2 mg, 2 mg, Sublingual, Q1H PRN, Alvin Calderon MD  •  magic mouthwash oral supsension 5 mL, 5 mL, Swish & Spit, Q4H PRN, Alvin Calderon MD  •  morphine injection 2 mg, 2 mg, Intravenous, Q1H PRN, 4 mg at 02/08/18 0634 **OR** morphine concentrated solution solution 10 mg, 10 mg, Oral, Q1H PRN, Alvin Calderon MD  •  morphine injection 4 mg, 4 mg, Intravenous, Q1H PRN, Alvin Calderon MD, 4 mg at 02/08/18 1230  •  promethazine (PHENERGAN) injection 6.25 mg, 6.25 mg, Intravenous, Q4H PRN **OR** promethazine (PHENERGAN) tablet 6.25 mg, 6.25 mg, Oral, Q4H PRN **OR** promethazine (PHENERGAN) 6.25 MG/5ML syrup 6.25 mg, 6.25 mg, Oral, Q4H PRN **OR** promethazine (PHENERGAN) suppository 6.25 mg, 6.25 mg, Rectal, Q4H PRN, Alvin Calderon MD     Assessment:    Respiratory distress, acute    Bradycardia asymptomatic no need for pacemaker    Acute CHF    S/p recent MI     Chronic AFib    Dementia         Plan:  HSB  Comfort Care only  Allow natural death  Routine palliative care orders  Discussed with family      Alvin Calderon MD  2/8/2018  2:37 PM

## 2018-02-08 NOTE — PLAN OF CARE
Problem: Patient Care Overview (Adult)  Goal: Plan of Care Review  Outcome: Ongoing (interventions implemented as appropriate)   02/08/18 5596   Patient Care Overview   Progress declining   Outcome Evaluation   Outcome Summary/Follow up Plan pt remains comfortable throughout shift, congested robinol given and pt. placed in recovery position. pt medicated x2 with prn meds for comfort. will continue to montitor for comfort   Coping/Psychosocial Response Interventions   Plan Of Care Reviewed With patient     Goal: Adult Individualization and Mutuality  Outcome: Ongoing (interventions implemented as appropriate)    Goal: Discharge Needs Assessment  Outcome: Ongoing (interventions implemented as appropriate)      Problem: Dying Patient, Actively (Adult)  Goal: Comfort/Pain Control  Outcome: Ongoing (interventions implemented as appropriate)    Goal: Dying Process, Peace and Dignity  Outcome: Ongoing (interventions implemented as appropriate)      Problem: Pressure Ulcer (Adult)  Goal: Signs and Symptoms of Listed Potential Problems Will be Absent or Manageable (Pressure Ulcer)  Outcome: Ongoing (interventions implemented as appropriate)

## 2018-02-08 NOTE — PLAN OF CARE
Problem: Patient Care Overview (Adult)  Goal: Plan of Care Review  Outcome: Ongoing (interventions implemented as appropriate)   02/08/18 0422 02/08/18 1845   Patient Care Overview   Progress declining --    Outcome Evaluation   Outcome Summary/Follow up Plan --  Pt declining. Orally suctioned large amounts of blood tinged sputum. Unresponsive. Will continue to monitor.   Coping/Psychosocial Response Interventions   Plan Of Care Reviewed With patient --      Goal: Adult Individualization and Mutuality  Outcome: Ongoing (interventions implemented as appropriate)    Goal: Discharge Needs Assessment  Outcome: Ongoing (interventions implemented as appropriate)      Problem: Dying Patient, Actively (Adult)  Goal: Comfort/Pain Control  Outcome: Ongoing (interventions implemented as appropriate)    Goal: Dying Process, Peace and Dignity  Outcome: Ongoing (interventions implemented as appropriate)      Problem: Pressure Ulcer (Adult)  Goal: Signs and Symptoms of Listed Potential Problems Will be Absent or Manageable (Pressure Ulcer)  Outcome: Ongoing (interventions implemented as appropriate)

## 2018-02-09 NOTE — PLAN OF CARE
Problem: Patient Care Overview (Adult)  Goal: Plan of Care Review  Outcome: Ongoing (interventions implemented as appropriate)   02/09/18 1708   Patient Care Overview   Progress declining   Outcome Evaluation   Outcome Summary/Follow up Plan Pt has been unresponsive to pain. Medicated x2 with morphine for dyspnea and robinul x3 for congestion with improvement after medication. F/c in place. q4h turns tolerated well. No sputum. Recovery position. Appears comfortable. Will continue to monitor and provide comfort care.   Coping/Psychosocial Response Interventions   Plan Of Care Reviewed With patient     Goal: Adult Individualization and Mutuality  Outcome: Ongoing (interventions implemented as appropriate)    Goal: Discharge Needs Assessment  Outcome: Ongoing (interventions implemented as appropriate)      Problem: Dying Patient, Actively (Adult)  Goal: Comfort/Pain Control  Outcome: Ongoing (interventions implemented as appropriate)    Goal: Dying Process, Peace and Dignity  Outcome: Ongoing (interventions implemented as appropriate)      Problem: Pressure Ulcer (Adult)  Goal: Signs and Symptoms of Listed Potential Problems Will be Absent or Manageable (Pressure Ulcer)  Outcome: Ongoing (interventions implemented as appropriate)

## 2018-02-09 NOTE — PROGRESS NOTES
Daily progress note        Patient Identification:  Name: Angie Mckeon  Age: 88 y.o.  Sex: female  :  1929  MRN: [id number]                     Primary Care Physician: Jenelle Cabrera DO    Chief Complaint:    Comfortable  No new issues  Family at bedside    History of Present Illness:   88-year-old white female with history of chronic atrial fibrillation on anticoagulation other medical problem dementia brain tumor osteoarthritis osteoporosis who is a nursing home resident sent to the [facility] emergency room on 18after being noted to have acute onset SOA and gurgling white frothy sputum. Pt placed on bipap by EMS en route to ED. Pt is a DNR.  Patient was very confused and unable to provide any further info. Had recent hospitalization here for MI      Review of Systems  See history of present illness and past medical history.  Patient unable to provide any meaningful answers    Objective:  /61 (BP Location: Left arm, Patient Position: Lying)  Pulse 94  Temp (!) 101.3 °F (38.5 °C) (Oral)   Resp 22  SpO2 (!) 87%    Exam unchanged  .    Data Review:  No results found for: WBC, HGB, HCT, PLT  No results found for: NA, K, CL, CO2, BUN, CREATININE, GLUCOSE  No results found for: CALCIUM, MG, PHOS  No results found for: AST, ALT, ALKPHOS  No results found for: APTT, INR             Imaging Results (all)     Procedure Component Value Units Date/Time    XR Chest 1 View [[id number]] Collected:  18     Updated:  18    Narrative:       PORTABLE CHEST X-RAY     HISTORY: soa     COMPARISON: 2018.     FINDINGS: Portable AP view of the chest was obtained. Lungs are under  aerated but grossly  clear where visualized. Stable cardiomegaly.  Vascularity is normal.   No obvious significant pleural fluid  collections.           Impression:          Poor inspiration without active disease identified,     This report was finalized on 2018 7:30 PM by Hernandez Bañuelos MD.              Current Facility-Administered Medications:   •  acetaminophen (TYLENOL) tablet 650 mg, 650 mg, Oral, Q4H PRN **OR** acetaminophen (TYLENOL) 160 MG/5ML solution 650 mg, 650 mg, Oral, Q4H PRN **OR** acetaminophen (TYLENOL) suppository 650 mg, 650 mg, Rectal, Q4H PRN, Alvin Calderon MD  •  diphenoxylate-atropine (LOMOTIL) 2.5-0.025 MG per tablet 1 tablet, 1 tablet, Oral, Q2H PRN, Alvin Calderon MD  •  Glycerin-Hypromellose- (ARTIFICIAL TEARS) 0.2-0.2-1 % ophthalmic solution solution 1 drop, 1 drop, Both Eyes, Q30 Min PRN, Alvin Calderon MD  •  glycopyrrolate (ROBINUL) injection 0.2 mg, 0.2 mg, Intravenous, Q2H PRN **OR** glycopyrrolate (ROBINUL) injection 0.2 mg, 0.2 mg, Subcutaneous, Q2H PRN **OR** glycopyrrolate (ROBINUL) injection 0.4 mg, 0.4 mg, Intravenous, Q2H PRN, 0.4 mg at 02/09/18 1156 **OR** glycopyrrolate (ROBINUL) injection 0.4 mg, 0.4 mg, Subcutaneous, Q2H PRN, Alvin Calderon MD  •  LORazepam (ATIVAN) tablet 0.5 mg, 0.5 mg, Oral, Q1H PRN **OR** LORazepam (ATIVAN) injection 0.5 mg, 0.5 mg, Intravenous, Q1H PRN, 0.5 mg at 02/05/18 1842 **OR** LORazepam (ATIVAN) injection 0.5 mg, 0.5 mg, Intramuscular, Q1H PRN **OR** LORazepam (ATIVAN) 2 MG/ML concentrated solution 0.5 mg, 0.5 mg, Oral, Q1H PRN **OR** LORazepam (ATIVAN) 2 MG/ML concentrated solution 0.5 mg, 0.5 mg, Sublingual, Q1H PRN, Alvin Calderon MD  •  LORazepam (ATIVAN) tablet 1 mg, 1 mg, Oral, Q1H PRN **OR** LORazepam (ATIVAN) injection 1 mg, 1 mg, Intravenous, Q1H PRN, 1 mg at 02/07/18 1641 **OR** LORazepam (ATIVAN) injection 1 mg, 1 mg, Intramuscular, Q1H PRN **OR** LORazepam (ATIVAN) 2 MG/ML concentrated solution 1 mg, 1 mg, Oral, Q1H PRN **OR** LORazepam (ATIVAN) 2 MG/ML concentrated solution 1 mg, 1 mg, Sublingual, Q1H PRN, Alvin MD Kvng  •  LORazepam (ATIVAN) tablet 2 mg, 2 mg, Oral, Q1H PRN **OR** LORazepam (ATIVAN) injection 2 mg, 2 mg, Intravenous, Q1H PRN, 2 mg at 02/08/18 1609 **OR** LORazepam (ATIVAN) injection 2 mg, 2 mg,  Intramuscular, Q1H PRN **OR** LORazepam (ATIVAN) 2 MG/ML concentrated solution 2 mg, 2 mg, Oral, Q1H PRN **OR** LORazepam (ATIVAN) 2 MG/ML concentrated solution 2 mg, 2 mg, Sublingual, Q1H PRN, Alvin Calderon MD  •  magic mouthwash oral supsension 5 mL, 5 mL, Swish & Spit, Q4H PRN, Alvin Calderon MD  •  morphine injection 2 mg, 2 mg, Intravenous, Q1H PRN, 4 mg at 02/08/18 0634 **OR** morphine concentrated solution solution 10 mg, 10 mg, Oral, Q1H PRN, Alvin Calderon MD  •  morphine injection 4 mg, 4 mg, Intravenous, Q1H PRN, Alvin Calderon MD, 4 mg at 02/09/18 1156  •  promethazine (PHENERGAN) injection 6.25 mg, 6.25 mg, Intravenous, Q4H PRN **OR** promethazine (PHENERGAN) tablet 6.25 mg, 6.25 mg, Oral, Q4H PRN **OR** promethazine (PHENERGAN) 6.25 MG/5ML syrup 6.25 mg, 6.25 mg, Oral, Q4H PRN **OR** promethazine (PHENERGAN) suppository 6.25 mg, 6.25 mg, Rectal, Q4H PRN, Alvin Calderon MD, 6.25 mg at 02/08/18 1609     Assessment:    Respiratory distress, acute    Bradycardia asymptomatic no need for pacemaker    Acute CHF    S/p recent MI     Chronic AFib    Dementia         Plan:  HSB  Comfort Care only  Allow natural death  Routine palliative care orders  Discussed with family      Alvin Calderon MD  2/9/2018  1:41 PM

## 2018-02-09 NOTE — PLAN OF CARE
Problem: Patient Care Overview (Adult)  Goal: Plan of Care Review  Outcome: Ongoing (interventions implemented as appropriate)   02/09/18 0634   Patient Care Overview   Progress declining   Outcome Evaluation   Outcome Summary/Follow up Plan Appears comfortable. Congested, improved with position. No family present. Will continue palliative measures   Coping/Psychosocial Response Interventions   Plan Of Care Reviewed With patient       Problem: Dying Patient, Actively (Adult)  Goal: Comfort/Pain Control  Outcome: Ongoing (interventions implemented as appropriate)    Goal: Dying Process, Peace and Dignity  Outcome: Ongoing (interventions implemented as appropriate)      Problem: Pressure Ulcer (Adult)  Goal: Signs and Symptoms of Listed Potential Problems Will be Absent or Manageable (Pressure Ulcer)  Outcome: Ongoing (interventions implemented as appropriate)

## 2018-02-10 NOTE — DISCHARGE SUMMARY
Discharge summary    Date of admission 2080  Date of death 2/10/2018 at 3:25 AM    Discussion  88-year-old white female who is well-known to our service with multiple medical problems including chronic atrial fibrillation dementia coronary artery disease congestive heart failure admitted through emergency room with shortness of breath.  Patient workup in ER revealed decompressed CHF admitted for management.  Patient admitted to monitored bed and treated with IV diuretics supplement oxygen nebulizer and home medication.  Patient was followed by cardiology.  Patient was DNR and active with hospice service.  As patient was not making any progress and after discussion with family and they decided to pursue palliative care unit.  Patient transferred to palliative care unit where she remained comfortable and then moved to hospice scattered bed for comfort care.  Patient remained comfortable and  this morning.  Patient was DNR and was made along natural death.  Her body released with family.  I have answered all questions.  For details see my discharge summary from acute care.    Cause of death cardiopulmonary failure    MAYUR IBARRA MD

## 2018-02-12 NOTE — PROGRESS NOTES
Case Management Discharge Note    Final Note: The patient  on 2/10/18 @ 03:25. ALETA Hughes RN, CCP.     Discharge Placement     No information found             Discharge Codes: Hospice discharge status code 41  in a medical facility, such as hospital, SNF, ICF or free-standing hospice
